# Patient Record
Sex: MALE | Race: WHITE | NOT HISPANIC OR LATINO | Employment: FULL TIME | URBAN - NONMETROPOLITAN AREA
[De-identification: names, ages, dates, MRNs, and addresses within clinical notes are randomized per-mention and may not be internally consistent; named-entity substitution may affect disease eponyms.]

---

## 2017-01-10 RX ORDER — DEXTROAMPHETAMINE SACCHARATE, AMPHETAMINE ASPARTATE MONOHYDRATE, DEXTROAMPHETAMINE SULFATE AND AMPHETAMINE SULFATE 5; 5; 5; 5 MG/1; MG/1; MG/1; MG/1
20 CAPSULE, EXTENDED RELEASE ORAL EVERY MORNING
Qty: 30 CAPSULE | Refills: 0 | Status: SHIPPED | OUTPATIENT
Start: 2017-01-10 | End: 2017-02-14 | Stop reason: SDUPTHER

## 2017-02-14 RX ORDER — DEXTROAMPHETAMINE SACCHARATE, AMPHETAMINE ASPARTATE MONOHYDRATE, DEXTROAMPHETAMINE SULFATE AND AMPHETAMINE SULFATE 5; 5; 5; 5 MG/1; MG/1; MG/1; MG/1
20 CAPSULE, EXTENDED RELEASE ORAL EVERY MORNING
Qty: 30 CAPSULE | Refills: 0 | Status: SHIPPED | OUTPATIENT
Start: 2017-02-14 | End: 2017-03-20 | Stop reason: SDUPTHER

## 2017-03-20 ENCOUNTER — OFFICE VISIT (OUTPATIENT)
Dept: FAMILY MEDICINE CLINIC | Facility: CLINIC | Age: 42
End: 2017-03-20

## 2017-03-20 VITALS
WEIGHT: 168 LBS | SYSTOLIC BLOOD PRESSURE: 118 MMHG | HEART RATE: 70 BPM | RESPIRATION RATE: 17 BRPM | BODY MASS INDEX: 24.88 KG/M2 | OXYGEN SATURATION: 97 % | TEMPERATURE: 98.3 F | HEIGHT: 69 IN | DIASTOLIC BLOOD PRESSURE: 78 MMHG

## 2017-03-20 DIAGNOSIS — F98.8 ATTENTION DEFICIT DISORDER (ADD): Primary | ICD-10-CM

## 2017-03-20 PROCEDURE — 99213 OFFICE O/P EST LOW 20 MIN: CPT | Performed by: NURSE PRACTITIONER

## 2017-03-20 RX ORDER — DEXTROAMPHETAMINE SACCHARATE, AMPHETAMINE ASPARTATE MONOHYDRATE, DEXTROAMPHETAMINE SULFATE AND AMPHETAMINE SULFATE 5; 5; 5; 5 MG/1; MG/1; MG/1; MG/1
20 CAPSULE, EXTENDED RELEASE ORAL EVERY MORNING
Qty: 30 CAPSULE | Refills: 0
Start: 2017-03-20 | End: 2017-04-17 | Stop reason: SDUPTHER

## 2017-03-20 RX ORDER — DEXTROAMPHETAMINE SACCHARATE, AMPHETAMINE ASPARTATE MONOHYDRATE, DEXTROAMPHETAMINE SULFATE AND AMPHETAMINE SULFATE 5; 5; 5; 5 MG/1; MG/1; MG/1; MG/1
20 CAPSULE, EXTENDED RELEASE ORAL EVERY MORNING
Qty: 30 CAPSULE | Refills: 0 | Status: SHIPPED | OUTPATIENT
Start: 2017-03-20 | End: 2017-03-20 | Stop reason: SDUPTHER

## 2017-03-20 NOTE — PROGRESS NOTES
Chief Complaint   Patient presents with   • ADD     3 month f/u requesting refill on Adderall       Subjective   HPI   Howard Cotton is a 41 y.o. male presents to the office for evaluation of:  ADD   This is a chronic problem. The current episode started more than 1 year ago. The problem occurs constantly. The problem has been unchanged. Pertinent negatives include no abdominal pain, anorexia, arthralgias, change in bowel habit, chest pain, chills, congestion, coughing, diaphoresis, fatigue, fever, headaches, joint swelling, myalgias, nausea, numbness, rash, sore throat, swollen glands, urinary symptoms, vertigo, visual change, vomiting or weakness. Nothing aggravates the symptoms. Treatments tried: adderall XR. The treatment provided no relief.     He presents for evaluation of ADD and refill of controlled medication adderall XR. He is tolerating his medication well and denies side effects.     Family History   Problem Relation Age of Onset   • Hypertension Other    • No Known Problems Mother    • Hyperlipidemia Father    • Heart disease Father    • No Known Problems Brother    • No Known Problems Daughter    • No Known Problems Son    • No Known Problems Maternal Grandmother    • No Known Problems Maternal Grandfather    • No Known Problems Paternal Grandmother    • Stroke Paternal Grandfather    • No Known Problems Brother    • No Known Problems Daughter    • No Known Problems Daughter      Social History     Social History   • Marital status:      Spouse name: Mitzi   • Number of children: 4   • Years of education: N/A     Occupational History   • Not on file.     Social History Main Topics   • Smoking status: Never Smoker   • Smokeless tobacco: Never Used      Comment: never smoked   • Alcohol use Yes      Comment: rarely   • Drug use: No   • Sexual activity: Yes     Partners: Female     Other Topics Concern   • Not on file     Social History Narrative   • No narrative on file       Review of  "Systems   Constitutional: Negative.  Negative for activity change, appetite change, chills, diaphoresis, fatigue, fever and unexpected weight change.   HENT: Negative.  Negative for congestion, drooling, facial swelling, postnasal drip, rhinorrhea, sinus pressure, sore throat, trouble swallowing and voice change.    Eyes: Negative.  Negative for photophobia, pain, discharge and visual disturbance.   Respiratory: Negative.  Negative for apnea, cough, choking and wheezing.    Cardiovascular: Negative.  Negative for chest pain, palpitations and leg swelling.   Gastrointestinal: Negative.  Negative for abdominal distention, abdominal pain, anorexia, change in bowel habit, constipation, diarrhea, nausea and vomiting.   Endocrine: Negative.  Negative for polyphagia and polyuria.   Genitourinary: Negative.  Negative for decreased urine volume, difficulty urinating and dysuria.   Musculoskeletal: Negative.  Negative for arthralgias, gait problem, joint swelling and myalgias.   Skin: Negative.  Negative for rash and wound.   Allergic/Immunologic: Negative.    Neurological: Negative.  Negative for dizziness, vertigo, syncope, weakness, light-headedness, numbness and headaches.   Hematological: Negative.    Psychiatric/Behavioral: Negative.  Negative for confusion, decreased concentration and sleep disturbance. The patient is not nervous/anxious.    All other systems reviewed and are negative.    14 Point ROS completed with pertinent positives discussed. All other systems reviewed and are negative.     The following portions of the patient's history were reviewed and updated as appropriate: allergies, current medications, past family history, past medical history, past social history, past surgical history and problem list.    Encounter Vitals  Vitals:    03/20/17 0836   BP: 118/78   Pulse: 70   Resp: 17   Temp: 98.3 °F (36.8 °C)   SpO2: 97%   Weight: 168 lb (76.2 kg)   Height: 69\" (175.3 cm)   PainSc: 0-No pain "       Objective:  Physical Exam   Constitutional: He is oriented to person, place, and time. Vital signs are normal. He appears well-developed and well-nourished.   HENT:   Head: Normocephalic and atraumatic.   Right Ear: Tympanic membrane, external ear and ear canal normal.   Left Ear: Tympanic membrane, external ear and ear canal normal.   Nose: Nose normal.   Mouth/Throat: Uvula is midline, oropharynx is clear and moist and mucous membranes are normal. No posterior oropharyngeal edema or posterior oropharyngeal erythema.   Eyes: Lids are normal. Pupils are equal, round, and reactive to light.   Neck: Trachea normal and normal range of motion. Neck supple. No thyroid mass present.   Cardiovascular: Normal rate, regular rhythm, S1 normal, S2 normal, normal heart sounds and intact distal pulses.  Exam reveals no gallop and no friction rub.    No murmur heard.  Pulmonary/Chest: Effort normal and breath sounds normal. No respiratory distress. He has no wheezes. He has no rales.   Abdominal: Soft. Normal appearance and bowel sounds are normal. He exhibits no mass. There is no rebound and no guarding.   Musculoskeletal: Normal range of motion.   Lymphadenopathy:     He has no cervical adenopathy.   Neurological: He is alert and oriented to person, place, and time. He has normal strength. No cranial nerve deficit or sensory deficit. Gait normal.   Skin: Skin is warm and dry. No rash noted.   Psychiatric: He has a normal mood and affect. His speech is normal and behavior is normal. Judgment and thought content normal. Cognition and memory are normal.   Nursing note and vitals reviewed.      Pertinent Labs  No visits with results within 3 Month(s) from this visit.  Latest known visit with results is:    Hospital Outpatient Visit on 11/05/2015   Component Date Value Ref Range Status   • Spec Descr 1 11/05/2015 SPECIMEN(S): A ANTRAL BIOPSY   Final   • Specimen description 2 11/05/2015 SPECIMEN(S): B ESOPHAGEAL BIOPSY   Final      Labs have been independently reviewed    Key Imaging/Tracings/POC Testing    Assessment and Plan  Howard was seen today for add.    Diagnoses and all orders for this visit:    Attention deficit disorder (ADD)    Other orders  -     amphetamine-dextroamphetamine XR (ADDERALL XR) 20 MG 24 hr capsule; Take 1 capsule by mouth Every Morning.    Side effects of ordered medications discussed with patient.     Additional Notes/Instructions    Follow-Up  Return if symptoms worsen or fail to improve, for Next scheduled follow up.    Patient/caregiver verbalizes understanding of all orders and instructions in this plan of care.

## 2017-04-18 RX ORDER — DEXTROAMPHETAMINE SACCHARATE, AMPHETAMINE ASPARTATE MONOHYDRATE, DEXTROAMPHETAMINE SULFATE AND AMPHETAMINE SULFATE 5; 5; 5; 5 MG/1; MG/1; MG/1; MG/1
20 CAPSULE, EXTENDED RELEASE ORAL EVERY MORNING
Qty: 30 CAPSULE | Refills: 0 | Status: SHIPPED | OUTPATIENT
Start: 2017-04-18 | End: 2017-05-18 | Stop reason: SDUPTHER

## 2017-05-18 ENCOUNTER — OFFICE VISIT (OUTPATIENT)
Dept: FAMILY MEDICINE CLINIC | Facility: CLINIC | Age: 42
End: 2017-05-18

## 2017-05-18 VITALS
TEMPERATURE: 97.8 F | SYSTOLIC BLOOD PRESSURE: 120 MMHG | DIASTOLIC BLOOD PRESSURE: 76 MMHG | WEIGHT: 170 LBS | HEIGHT: 69 IN | OXYGEN SATURATION: 99 % | HEART RATE: 80 BPM | BODY MASS INDEX: 25.18 KG/M2

## 2017-05-18 DIAGNOSIS — F98.8 ATTENTION DEFICIT DISORDER (ADD): Primary | ICD-10-CM

## 2017-05-18 DIAGNOSIS — Z02.5 ROUTINE SPORTS PHYSICAL EXAM: ICD-10-CM

## 2017-05-18 PROCEDURE — 99396 PREV VISIT EST AGE 40-64: CPT | Performed by: NURSE PRACTITIONER

## 2017-05-18 RX ORDER — DEXTROAMPHETAMINE SACCHARATE, AMPHETAMINE ASPARTATE MONOHYDRATE, DEXTROAMPHETAMINE SULFATE AND AMPHETAMINE SULFATE 5; 5; 5; 5 MG/1; MG/1; MG/1; MG/1
20 CAPSULE, EXTENDED RELEASE ORAL EVERY MORNING
Qty: 30 CAPSULE | Refills: 0 | Status: CANCELLED | OUTPATIENT
Start: 2017-05-18

## 2017-05-18 RX ORDER — DEXTROAMPHETAMINE SACCHARATE, AMPHETAMINE ASPARTATE MONOHYDRATE, DEXTROAMPHETAMINE SULFATE AND AMPHETAMINE SULFATE 5; 5; 5; 5 MG/1; MG/1; MG/1; MG/1
20 CAPSULE, EXTENDED RELEASE ORAL EVERY MORNING
Qty: 30 CAPSULE | Refills: 0 | Status: SHIPPED | OUTPATIENT
Start: 2017-05-18 | End: 2017-06-16 | Stop reason: SDUPTHER

## 2017-05-18 RX ORDER — ACYCLOVIR 800 MG/1
800 TABLET ORAL 4 TIMES DAILY
Qty: 28 TABLET | Refills: 0 | Status: SHIPPED | OUTPATIENT
Start: 2017-05-18 | End: 2017-12-22 | Stop reason: SDUPTHER

## 2017-06-16 ENCOUNTER — OFFICE VISIT (OUTPATIENT)
Dept: FAMILY MEDICINE CLINIC | Facility: CLINIC | Age: 42
End: 2017-06-16

## 2017-06-16 VITALS
BODY MASS INDEX: 24.88 KG/M2 | SYSTOLIC BLOOD PRESSURE: 118 MMHG | HEIGHT: 69 IN | HEART RATE: 76 BPM | RESPIRATION RATE: 16 BRPM | TEMPERATURE: 97.7 F | WEIGHT: 168 LBS | OXYGEN SATURATION: 96 % | DIASTOLIC BLOOD PRESSURE: 76 MMHG

## 2017-06-16 DIAGNOSIS — F98.8 ATTENTION DEFICIT DISORDER (ADD): Primary | ICD-10-CM

## 2017-06-16 PROCEDURE — 99213 OFFICE O/P EST LOW 20 MIN: CPT | Performed by: FAMILY MEDICINE

## 2017-06-16 RX ORDER — DEXTROAMPHETAMINE SACCHARATE, AMPHETAMINE ASPARTATE MONOHYDRATE, DEXTROAMPHETAMINE SULFATE AND AMPHETAMINE SULFATE 5; 5; 5; 5 MG/1; MG/1; MG/1; MG/1
20 CAPSULE, EXTENDED RELEASE ORAL EVERY MORNING
Qty: 30 CAPSULE | Refills: 0 | Status: SHIPPED | OUTPATIENT
Start: 2017-06-16 | End: 2017-07-14 | Stop reason: SDUPTHER

## 2017-06-16 NOTE — PROGRESS NOTES
Subjective   Howard Cotton is a 41 y.o. male.     History of Present Illness   Patient with history of attention deficit disorder in today for reevaluation and refill of medications.  Medications are still effective and he is tolerating without side effect.    The following portions of the patient's history were reviewed and updated as appropriate: allergies, current medications, past family history, past medical history, past social history, past surgical history and problem list.    Review of Systems   Constitutional: Negative.    HENT: Negative.    Eyes: Negative.    Respiratory: Negative.    Cardiovascular: Negative.    Gastrointestinal: Negative.    Endocrine: Negative.    Genitourinary: Negative.    Musculoskeletal: Negative.    Skin: Negative.    Allergic/Immunologic: Negative.    Neurological: Negative.    Hematological: Negative.    Psychiatric/Behavioral: Negative.    All other systems reviewed and are negative.      Objective   Physical Exam   Constitutional: He is oriented to person, place, and time. He appears well-developed and well-nourished.   HENT:   Head: Normocephalic and atraumatic.   Right Ear: External ear normal.   Left Ear: External ear normal.   Nose: Nose normal.   Mouth/Throat: Oropharynx is clear and moist.   Eyes: Conjunctivae and EOM are normal. Pupils are equal, round, and reactive to light.   Neck: Normal range of motion. Neck supple.   Cardiovascular: Normal rate, regular rhythm, normal heart sounds and intact distal pulses.  Exam reveals no gallop and no friction rub.    No murmur heard.  Pulmonary/Chest: Effort normal and breath sounds normal. He has no wheezes. He has no rales.   Abdominal: Soft. Bowel sounds are normal. He exhibits no mass. There is no tenderness. There is no rebound and no guarding.   Musculoskeletal: Normal range of motion.   Neurological: He is alert and oriented to person, place, and time. He has normal reflexes. No cranial nerve deficit. He exhibits  normal muscle tone.   Skin: Skin is warm and dry. No rash noted.   Psychiatric: He has a normal mood and affect. His behavior is normal. Judgment and thought content normal.   Nursing note and vitals reviewed.    Joel reviewed    Assessment/Plan   Howard was seen today for add and med refill.    Diagnoses and all orders for this visit:    Attention deficit disorder (ADD)    Other orders  -     amphetamine-dextroamphetamine XR (ADDERALL XR) 20 MG 24 hr capsule; Take 1 capsule by mouth Every Morning.

## 2017-07-14 RX ORDER — DEXTROAMPHETAMINE SACCHARATE, AMPHETAMINE ASPARTATE MONOHYDRATE, DEXTROAMPHETAMINE SULFATE AND AMPHETAMINE SULFATE 5; 5; 5; 5 MG/1; MG/1; MG/1; MG/1
20 CAPSULE, EXTENDED RELEASE ORAL EVERY MORNING
Qty: 30 CAPSULE | Refills: 0 | Status: SHIPPED | OUTPATIENT
Start: 2017-07-14 | End: 2017-08-18 | Stop reason: SDUPTHER

## 2017-08-18 RX ORDER — DEXTROAMPHETAMINE SACCHARATE, AMPHETAMINE ASPARTATE MONOHYDRATE, DEXTROAMPHETAMINE SULFATE AND AMPHETAMINE SULFATE 5; 5; 5; 5 MG/1; MG/1; MG/1; MG/1
20 CAPSULE, EXTENDED RELEASE ORAL EVERY MORNING
Qty: 30 CAPSULE | Refills: 0 | Status: SHIPPED | OUTPATIENT
Start: 2017-08-18 | End: 2017-09-12 | Stop reason: SDUPTHER

## 2017-09-12 ENCOUNTER — OFFICE VISIT (OUTPATIENT)
Dept: FAMILY MEDICINE CLINIC | Facility: CLINIC | Age: 42
End: 2017-09-12

## 2017-09-12 ENCOUNTER — LAB (OUTPATIENT)
Dept: LAB | Facility: OTHER | Age: 42
End: 2017-09-12

## 2017-09-12 VITALS
HEIGHT: 70 IN | HEART RATE: 96 BPM | BODY MASS INDEX: 24.34 KG/M2 | SYSTOLIC BLOOD PRESSURE: 118 MMHG | RESPIRATION RATE: 16 BRPM | OXYGEN SATURATION: 96 % | WEIGHT: 170 LBS | TEMPERATURE: 97.7 F | DIASTOLIC BLOOD PRESSURE: 82 MMHG

## 2017-09-12 DIAGNOSIS — F98.8 ATTENTION DEFICIT DISORDER (ADD): ICD-10-CM

## 2017-09-12 DIAGNOSIS — N40.1 BENIGN PROSTATIC HYPERPLASIA WITH LOWER URINARY TRACT SYMPTOMS, UNSPECIFIED MORPHOLOGY: Primary | ICD-10-CM

## 2017-09-12 PROCEDURE — 80307 DRUG TEST PRSMV CHEM ANLYZR: CPT | Performed by: FAMILY MEDICINE

## 2017-09-12 PROCEDURE — 99214 OFFICE O/P EST MOD 30 MIN: CPT | Performed by: FAMILY MEDICINE

## 2017-09-12 PROCEDURE — G0480 DRUG TEST DEF 1-7 CLASSES: HCPCS

## 2017-09-12 RX ORDER — FINASTERIDE 5 MG/1
5 TABLET, FILM COATED ORAL DAILY
Qty: 30 TABLET | Refills: 5 | Status: SHIPPED | OUTPATIENT
Start: 2017-09-12 | End: 2018-03-27 | Stop reason: SDUPTHER

## 2017-09-12 RX ORDER — DEXTROAMPHETAMINE SACCHARATE, AMPHETAMINE ASPARTATE MONOHYDRATE, DEXTROAMPHETAMINE SULFATE AND AMPHETAMINE SULFATE 5; 5; 5; 5 MG/1; MG/1; MG/1; MG/1
20 CAPSULE, EXTENDED RELEASE ORAL EVERY MORNING
Qty: 30 CAPSULE | Refills: 0 | Status: SHIPPED | OUTPATIENT
Start: 2017-09-12 | End: 2017-10-20 | Stop reason: SDUPTHER

## 2017-09-12 NOTE — PROGRESS NOTES
Subjective   Howard Cotton is a 41 y.o. male.   Chief Complaint   Patient presents with   • ADD     3 mo controlled requesting refill on Adderall XR       History of Present Illness   Patient with history of attention deficit disorder and BPH in today for reevaluation and refill of medications.  Patient is doing well and voices no acute complaints.  Medications are still effective and tolerating without side effect.    The following portions of the patient's history were reviewed and updated as appropriate: allergies, current medications, past family history, past medical history, past social history, past surgical history and problem list.    Review of Systems   Constitutional: Negative.    HENT: Negative.    Eyes: Negative.    Respiratory: Negative.    Cardiovascular: Negative.    Gastrointestinal: Negative.    Endocrine: Negative.    Genitourinary: Negative.    Musculoskeletal: Negative.    Skin: Negative.    Allergic/Immunologic: Negative.    Neurological: Negative.    Hematological: Negative.    Psychiatric/Behavioral: Negative.    All other systems reviewed and are negative.      Objective   Physical Exam   Constitutional: He is oriented to person, place, and time. He appears well-developed and well-nourished.   HENT:   Head: Normocephalic and atraumatic.   Right Ear: External ear normal.   Left Ear: External ear normal.   Nose: Nose normal.   Mouth/Throat: Oropharynx is clear and moist.   Eyes: Conjunctivae and EOM are normal. Pupils are equal, round, and reactive to light.   Neck: Normal range of motion. Neck supple.   Cardiovascular: Normal rate, regular rhythm, normal heart sounds and intact distal pulses.  Exam reveals no gallop and no friction rub.    No murmur heard.  Pulmonary/Chest: Effort normal and breath sounds normal. He has no wheezes. He has no rales.   Abdominal: Soft. Bowel sounds are normal. He exhibits no mass. There is no tenderness. There is no rebound and no guarding.    Musculoskeletal: Normal range of motion.   Neurological: He is alert and oriented to person, place, and time. He has normal reflexes. No cranial nerve deficit. He exhibits normal muscle tone.   Skin: Skin is warm and dry. No rash noted.   Psychiatric: He has a normal mood and affect. His behavior is normal. Judgment and thought content normal.   Nursing note and vitals reviewed.  anaid reviewed    Assessment/Plan   Howard was seen today for add.    Diagnoses and all orders for this visit:    Benign prostatic hyperplasia with lower urinary tract symptoms, unspecified morphology    Attention deficit disorder (ADD)  -     Pain Management Profile (13 Drugs) Urine; Future    Other orders  -     amphetamine-dextroamphetamine XR (ADDERALL XR) 20 MG 24 hr capsule; Take 1 capsule by mouth Every Morning.  -     finasteride (PROSCAR) 5 MG tablet; Take 1 tablet by mouth Daily.

## 2017-09-16 LAB
AMPHETAMINES UR QL SCN: POSITIVE
BARBITURATES UR QL SCN: NEGATIVE NG/ML
BENZODIAZ UR QL SCN: NEGATIVE NG/ML
BZE UR QL SCN: NEGATIVE NG/ML
CANNABINOIDS UR QL SCN: NEGATIVE NG/ML
CREAT 24H UR-MCNC: 255 MG/DL (ref 20–300)
FENTANYL+NORFENTANYL UR QL SCN: NEGATIVE PG/ML
Lab: ABNORMAL
MEPERIDINE UR CFM-MCNC: NEGATIVE NG/ML
METHADONE UR QL SCN: NEGATIVE NG/ML
OPIATES TESTED UR SCN: NEGATIVE NG/ML
OXYCODONE/OXYMORPHONE, URINE: NEGATIVE NG/ML
PCP UR QL: NEGATIVE NG/ML
PH UR STRIP.AUTO: 5.5 [PH] (ref 4.5–8.9)
PROPOXYPH UR QL SCN: NEGATIVE NG/ML
SP GR UR: 1.03
TRAMADOL UR QL SCN: NEGATIVE NG/ML

## 2017-10-20 RX ORDER — DEXTROAMPHETAMINE SACCHARATE, AMPHETAMINE ASPARTATE MONOHYDRATE, DEXTROAMPHETAMINE SULFATE AND AMPHETAMINE SULFATE 5; 5; 5; 5 MG/1; MG/1; MG/1; MG/1
20 CAPSULE, EXTENDED RELEASE ORAL EVERY MORNING
Qty: 30 CAPSULE | Refills: 0 | Status: SHIPPED | OUTPATIENT
Start: 2017-10-20 | End: 2017-11-20 | Stop reason: SDUPTHER

## 2017-11-20 RX ORDER — DEXTROAMPHETAMINE SACCHARATE, AMPHETAMINE ASPARTATE MONOHYDRATE, DEXTROAMPHETAMINE SULFATE AND AMPHETAMINE SULFATE 5; 5; 5; 5 MG/1; MG/1; MG/1; MG/1
20 CAPSULE, EXTENDED RELEASE ORAL EVERY MORNING
Qty: 30 CAPSULE | Refills: 0 | Status: SHIPPED | OUTPATIENT
Start: 2017-11-20 | End: 2017-12-22 | Stop reason: SDUPTHER

## 2017-12-21 ENCOUNTER — TELEPHONE (OUTPATIENT)
Dept: FAMILY MEDICINE CLINIC | Facility: CLINIC | Age: 42
End: 2017-12-21

## 2017-12-22 ENCOUNTER — OFFICE VISIT (OUTPATIENT)
Dept: FAMILY MEDICINE CLINIC | Facility: CLINIC | Age: 42
End: 2017-12-22

## 2017-12-22 VITALS
OXYGEN SATURATION: 99 % | TEMPERATURE: 97.9 F | DIASTOLIC BLOOD PRESSURE: 76 MMHG | SYSTOLIC BLOOD PRESSURE: 112 MMHG | RESPIRATION RATE: 16 BRPM | HEIGHT: 70 IN | BODY MASS INDEX: 24.34 KG/M2 | HEART RATE: 70 BPM | WEIGHT: 170 LBS

## 2017-12-22 DIAGNOSIS — F90.2 ATTENTION DEFICIT HYPERACTIVITY DISORDER (ADHD), COMBINED TYPE: Primary | ICD-10-CM

## 2017-12-22 PROCEDURE — 99213 OFFICE O/P EST LOW 20 MIN: CPT | Performed by: FAMILY MEDICINE

## 2017-12-22 RX ORDER — ACYCLOVIR 800 MG/1
800 TABLET ORAL 4 TIMES DAILY
Qty: 28 TABLET | Refills: 0 | Status: SHIPPED | OUTPATIENT
Start: 2017-12-22 | End: 2019-01-24 | Stop reason: SDUPTHER

## 2017-12-22 RX ORDER — DEXTROAMPHETAMINE SACCHARATE, AMPHETAMINE ASPARTATE MONOHYDRATE, DEXTROAMPHETAMINE SULFATE AND AMPHETAMINE SULFATE 5; 5; 5; 5 MG/1; MG/1; MG/1; MG/1
20 CAPSULE, EXTENDED RELEASE ORAL EVERY MORNING
Qty: 30 CAPSULE | Refills: 0 | Status: SHIPPED | OUTPATIENT
Start: 2017-12-22 | End: 2018-01-24 | Stop reason: SDUPTHER

## 2017-12-22 NOTE — PROGRESS NOTES
Subjective   Howard Cotton is a 42 y.o. male.   Chief Complaint   Patient presents with   • ADD     3 month f/u        ADD   This is a chronic problem. The current episode started more than 1 year ago. The problem occurs daily. The problem has been unchanged. Associated symptoms comments: Well controlled. Nothing aggravates the symptoms. Treatments tried: Adderral  The treatment provided significant relief.        The following portions of the patient's history were reviewed and updated as appropriate: allergies, current medications, past family history, past medical history, past social history, past surgical history and problem list.    Review of Systems   Constitutional: Negative.    HENT: Negative.    Eyes: Negative.    Respiratory: Negative.    Cardiovascular: Negative.    Gastrointestinal: Negative.    Endocrine: Negative.    Genitourinary: Negative.    Musculoskeletal: Negative.    Skin: Negative.    Allergic/Immunologic: Negative.    Neurological: Negative.    Hematological: Negative.    Psychiatric/Behavioral: Negative.    All other systems reviewed and are negative.      Objective   Physical Exam   Constitutional: He is oriented to person, place, and time. He appears well-developed and well-nourished.   HENT:   Head: Normocephalic and atraumatic.   Right Ear: External ear normal.   Left Ear: External ear normal.   Nose: Nose normal.   Mouth/Throat: Oropharynx is clear and moist.   Eyes: Conjunctivae and EOM are normal. Pupils are equal, round, and reactive to light.   Neck: Normal range of motion. Neck supple.   Cardiovascular: Normal rate, regular rhythm, normal heart sounds and intact distal pulses.  Exam reveals no gallop and no friction rub.    No murmur heard.  Pulmonary/Chest: Effort normal and breath sounds normal. He has no wheezes. He has no rales.   Abdominal: Soft. Bowel sounds are normal. He exhibits no mass. There is no tenderness. There is no rebound and no guarding.   Musculoskeletal:  Normal range of motion.   Neurological: He is alert and oriented to person, place, and time. He has normal reflexes. No cranial nerve deficit. He exhibits normal muscle tone.   Skin: Skin is warm and dry. No rash noted.   Psychiatric: He has a normal mood and affect. His behavior is normal. Judgment and thought content normal.   Nursing note and vitals reviewed.  Joel reviewed    Assessment/Plan   Howard was seen today for add.    Diagnoses and all orders for this visit:    Attention deficit hyperactivity disorder (ADHD), combined type    Other orders  -     amphetamine-dextroamphetamine XR (ADDERALL XR) 20 MG 24 hr capsule; Take 1 capsule by mouth Every Morning Earliest Fill Date: 12/22/17

## 2018-01-24 RX ORDER — DEXTROAMPHETAMINE SACCHARATE, AMPHETAMINE ASPARTATE MONOHYDRATE, DEXTROAMPHETAMINE SULFATE AND AMPHETAMINE SULFATE 5; 5; 5; 5 MG/1; MG/1; MG/1; MG/1
20 CAPSULE, EXTENDED RELEASE ORAL EVERY MORNING
Qty: 30 CAPSULE | Refills: 0 | Status: SHIPPED | OUTPATIENT
Start: 2018-01-24 | End: 2018-02-23 | Stop reason: SDUPTHER

## 2018-02-23 RX ORDER — DEXTROAMPHETAMINE SACCHARATE, AMPHETAMINE ASPARTATE MONOHYDRATE, DEXTROAMPHETAMINE SULFATE AND AMPHETAMINE SULFATE 5; 5; 5; 5 MG/1; MG/1; MG/1; MG/1
20 CAPSULE, EXTENDED RELEASE ORAL EVERY MORNING
Qty: 30 CAPSULE | Refills: 0 | Status: SHIPPED | OUTPATIENT
Start: 2018-02-23 | End: 2018-03-27 | Stop reason: SDUPTHER

## 2018-03-27 ENCOUNTER — OFFICE VISIT (OUTPATIENT)
Dept: FAMILY MEDICINE CLINIC | Facility: CLINIC | Age: 43
End: 2018-03-27

## 2018-03-27 VITALS
WEIGHT: 176 LBS | HEIGHT: 69 IN | OXYGEN SATURATION: 96 % | BODY MASS INDEX: 26.07 KG/M2 | RESPIRATION RATE: 16 BRPM | SYSTOLIC BLOOD PRESSURE: 114 MMHG | TEMPERATURE: 96.8 F | HEART RATE: 84 BPM | DIASTOLIC BLOOD PRESSURE: 76 MMHG

## 2018-03-27 DIAGNOSIS — N40.0 BENIGN PROSTATIC HYPERPLASIA WITHOUT LOWER URINARY TRACT SYMPTOMS: ICD-10-CM

## 2018-03-27 DIAGNOSIS — F90.2 ATTENTION DEFICIT HYPERACTIVITY DISORDER (ADHD), COMBINED TYPE: Primary | ICD-10-CM

## 2018-03-27 PROCEDURE — 99213 OFFICE O/P EST LOW 20 MIN: CPT | Performed by: FAMILY MEDICINE

## 2018-03-27 RX ORDER — DEXTROAMPHETAMINE SACCHARATE, AMPHETAMINE ASPARTATE MONOHYDRATE, DEXTROAMPHETAMINE SULFATE AND AMPHETAMINE SULFATE 5; 5; 5; 5 MG/1; MG/1; MG/1; MG/1
20 CAPSULE, EXTENDED RELEASE ORAL EVERY MORNING
Qty: 30 CAPSULE | Refills: 0 | Status: SHIPPED | OUTPATIENT
Start: 2018-03-27 | End: 2018-04-25 | Stop reason: SDUPTHER

## 2018-03-27 RX ORDER — FINASTERIDE 5 MG/1
5 TABLET, FILM COATED ORAL DAILY
Qty: 30 TABLET | Refills: 5 | Status: SHIPPED | OUTPATIENT
Start: 2018-03-27 | End: 2018-07-09 | Stop reason: SDUPTHER

## 2018-03-27 NOTE — PROGRESS NOTES
Subjective   Howard Cotton is a 42 y.o. male.   Chief Complaint   Patient presents with   • ADHD     controlled refill   • Med Refill       History of Present Illness   Patient with history of adult attention deficit disorder as well as BPH is in today for reevaluation and to obtain refill of medication.  Patient states that medications are still effective.  He is tolerating medications without side effect.    The following portions of the patient's history were reviewed and updated as appropriate: allergies, current medications, past family history, past medical history, past social history, past surgical history and problem list.    Review of Systems   Constitutional: Negative.    HENT: Negative.    Eyes: Negative.    Respiratory: Negative.    Cardiovascular: Negative.    Gastrointestinal: Negative.    Endocrine: Negative.    Genitourinary: Negative.    Musculoskeletal: Negative.    Skin: Negative.    Allergic/Immunologic: Negative.    Neurological: Negative.    Hematological: Negative.    Psychiatric/Behavioral: Negative.    All other systems reviewed and are negative.      Objective   Physical Exam   Constitutional: He is oriented to person, place, and time. He appears well-developed and well-nourished.   HENT:   Head: Normocephalic and atraumatic.   Right Ear: External ear normal.   Left Ear: External ear normal.   Nose: Nose normal.   Mouth/Throat: Oropharynx is clear and moist.   Eyes: Conjunctivae and EOM are normal. Pupils are equal, round, and reactive to light.   Neck: Normal range of motion. Neck supple.   Cardiovascular: Normal rate, regular rhythm, normal heart sounds and intact distal pulses.  Exam reveals no gallop and no friction rub.    No murmur heard.  Pulmonary/Chest: Effort normal and breath sounds normal. He has no wheezes. He has no rales.   Abdominal: Soft. Bowel sounds are normal. He exhibits no mass. There is no tenderness. There is no rebound and no guarding.   Musculoskeletal:  Normal range of motion.   Neurological: He is alert and oriented to person, place, and time. He has normal reflexes. No cranial nerve deficit. He exhibits normal muscle tone.   Skin: Skin is warm and dry. No rash noted.   Psychiatric: He has a normal mood and affect. His behavior is normal. Judgment and thought content normal.   Nursing note and vitals reviewed.    Joel reviewed  Assessment/Plan   Howard was seen today for adhd and med refill.    Diagnoses and all orders for this visit:    Attention deficit hyperactivity disorder (ADHD), combined type    Benign prostatic hyperplasia without lower urinary tract symptoms    Other orders  -     finasteride (PROSCAR) 5 MG tablet; Take 1 tablet by mouth Daily.  -     amphetamine-dextroamphetamine XR (ADDERALL XR) 20 MG 24 hr capsule; Take 1 capsule by mouth Every Morning Earliest Fill Date: 3/27/18

## 2018-04-25 RX ORDER — DEXTROAMPHETAMINE SACCHARATE, AMPHETAMINE ASPARTATE MONOHYDRATE, DEXTROAMPHETAMINE SULFATE AND AMPHETAMINE SULFATE 5; 5; 5; 5 MG/1; MG/1; MG/1; MG/1
20 CAPSULE, EXTENDED RELEASE ORAL EVERY MORNING
Qty: 30 CAPSULE | Refills: 0 | Status: SHIPPED | OUTPATIENT
Start: 2018-04-25 | End: 2018-05-29 | Stop reason: SDUPTHER

## 2018-05-29 RX ORDER — DEXTROAMPHETAMINE SACCHARATE, AMPHETAMINE ASPARTATE MONOHYDRATE, DEXTROAMPHETAMINE SULFATE AND AMPHETAMINE SULFATE 5; 5; 5; 5 MG/1; MG/1; MG/1; MG/1
20 CAPSULE, EXTENDED RELEASE ORAL EVERY MORNING
Qty: 30 CAPSULE | Refills: 0 | Status: SHIPPED | OUTPATIENT
Start: 2018-05-29 | End: 2018-07-09 | Stop reason: SDUPTHER

## 2018-07-09 ENCOUNTER — OFFICE VISIT (OUTPATIENT)
Dept: FAMILY MEDICINE CLINIC | Facility: CLINIC | Age: 43
End: 2018-07-09

## 2018-07-09 VITALS
HEART RATE: 75 BPM | BODY MASS INDEX: 26.22 KG/M2 | SYSTOLIC BLOOD PRESSURE: 110 MMHG | OXYGEN SATURATION: 98 % | TEMPERATURE: 97.9 F | WEIGHT: 177 LBS | HEIGHT: 69 IN | DIASTOLIC BLOOD PRESSURE: 60 MMHG

## 2018-07-09 DIAGNOSIS — F90.2 ATTENTION DEFICIT HYPERACTIVITY DISORDER (ADHD), COMBINED TYPE: Primary | ICD-10-CM

## 2018-07-09 DIAGNOSIS — N40.0 BENIGN PROSTATIC HYPERPLASIA WITHOUT LOWER URINARY TRACT SYMPTOMS: ICD-10-CM

## 2018-07-09 PROCEDURE — 99213 OFFICE O/P EST LOW 20 MIN: CPT | Performed by: FAMILY MEDICINE

## 2018-07-09 RX ORDER — DEXTROAMPHETAMINE SACCHARATE, AMPHETAMINE ASPARTATE MONOHYDRATE, DEXTROAMPHETAMINE SULFATE AND AMPHETAMINE SULFATE 5; 5; 5; 5 MG/1; MG/1; MG/1; MG/1
20 CAPSULE, EXTENDED RELEASE ORAL EVERY MORNING
Qty: 30 CAPSULE | Refills: 0 | Status: SHIPPED | OUTPATIENT
Start: 2018-07-09 | End: 2018-08-08 | Stop reason: SDUPTHER

## 2018-07-09 RX ORDER — FINASTERIDE 5 MG/1
5 TABLET, FILM COATED ORAL DAILY
Qty: 30 TABLET | Refills: 5 | Status: SHIPPED | OUTPATIENT
Start: 2018-07-09 | End: 2018-11-07 | Stop reason: SDUPTHER

## 2018-07-09 NOTE — PROGRESS NOTES
Subjective   Howard Cotton is a 42 y.o. male.   Chief Complaint   Patient presents with   • ADHD     Refill Adderall        History of Present Illness   Patient with history of attention deficit as well as BPH is in today for reevaluation and refill medications.  Patient states that medications are still effective and he is tolerating without side effect.  He voices no acute complaints    The following portions of the patient's history were reviewed and updated as appropriate: allergies, current medications, past family history, past medical history, past social history, past surgical history and problem list.    Review of Systems   Constitutional: Negative.    HENT: Negative.    Eyes: Negative.    Respiratory: Negative.    Cardiovascular: Negative.    Gastrointestinal: Negative.    Endocrine: Negative.    Genitourinary: Negative.    Musculoskeletal: Negative.    Skin: Negative.    Allergic/Immunologic: Negative.    Neurological: Negative.    Hematological: Negative.    Psychiatric/Behavioral: Negative.    All other systems reviewed and are negative.      Objective   Physical Exam   Constitutional: He is oriented to person, place, and time. He appears well-developed and well-nourished.   HENT:   Head: Normocephalic and atraumatic.   Right Ear: External ear normal.   Left Ear: External ear normal.   Nose: Nose normal.   Mouth/Throat: Oropharynx is clear and moist.   Eyes: Conjunctivae and EOM are normal. Pupils are equal, round, and reactive to light.   Neck: Normal range of motion. Neck supple.   Cardiovascular: Normal rate, regular rhythm, normal heart sounds and intact distal pulses.  Exam reveals no gallop and no friction rub.    No murmur heard.  Pulmonary/Chest: Effort normal and breath sounds normal. He has no wheezes. He has no rales.   Abdominal: Soft. Bowel sounds are normal. He exhibits no mass. There is no tenderness. There is no rebound and no guarding.   Musculoskeletal: Normal range of motion.    Neurological: He is alert and oriented to person, place, and time. He has normal reflexes. No cranial nerve deficit. He exhibits normal muscle tone.   Skin: Skin is warm and dry. No rash noted.   Psychiatric: He has a normal mood and affect. His behavior is normal. Judgment and thought content normal.   Nursing note and vitals reviewed.  Joel reviewed    Assessment/Plan   Howard was seen today for adhd.    Diagnoses and all orders for this visit:    Attention deficit hyperactivity disorder (ADHD), combined type    Benign prostatic hyperplasia without lower urinary tract symptoms    Other orders  -     amphetamine-dextroamphetamine XR (ADDERALL XR) 20 MG 24 hr capsule; Take 1 capsule by mouth Every Morning Earliest Fill Date: 7/9/18  -     finasteride (PROSCAR) 5 MG tablet; Take 1 tablet by mouth Daily.

## 2018-08-08 RX ORDER — DEXTROAMPHETAMINE SACCHARATE, AMPHETAMINE ASPARTATE MONOHYDRATE, DEXTROAMPHETAMINE SULFATE AND AMPHETAMINE SULFATE 5; 5; 5; 5 MG/1; MG/1; MG/1; MG/1
20 CAPSULE, EXTENDED RELEASE ORAL EVERY MORNING
Qty: 30 CAPSULE | Refills: 0 | Status: SHIPPED | OUTPATIENT
Start: 2018-08-08 | End: 2018-09-04 | Stop reason: SDUPTHER

## 2018-09-04 DIAGNOSIS — F90.2 ATTENTION DEFICIT HYPERACTIVITY DISORDER (ADHD), COMBINED TYPE: Primary | ICD-10-CM

## 2018-09-04 RX ORDER — DEXTROAMPHETAMINE SACCHARATE, AMPHETAMINE ASPARTATE MONOHYDRATE, DEXTROAMPHETAMINE SULFATE AND AMPHETAMINE SULFATE 5; 5; 5; 5 MG/1; MG/1; MG/1; MG/1
20 CAPSULE, EXTENDED RELEASE ORAL EVERY MORNING
Qty: 30 CAPSULE | Refills: 0 | Status: SHIPPED | OUTPATIENT
Start: 2018-09-04 | End: 2018-10-08 | Stop reason: SDUPTHER

## 2018-10-08 DIAGNOSIS — F90.2 ATTENTION DEFICIT HYPERACTIVITY DISORDER (ADHD), COMBINED TYPE: ICD-10-CM

## 2018-10-08 RX ORDER — DEXTROAMPHETAMINE SACCHARATE, AMPHETAMINE ASPARTATE MONOHYDRATE, DEXTROAMPHETAMINE SULFATE AND AMPHETAMINE SULFATE 5; 5; 5; 5 MG/1; MG/1; MG/1; MG/1
20 CAPSULE, EXTENDED RELEASE ORAL EVERY MORNING
Qty: 30 CAPSULE | Refills: 0 | Status: SHIPPED | OUTPATIENT
Start: 2018-10-08 | End: 2018-11-07 | Stop reason: SDUPTHER

## 2018-11-07 ENCOUNTER — OFFICE VISIT (OUTPATIENT)
Dept: FAMILY MEDICINE CLINIC | Facility: CLINIC | Age: 43
End: 2018-11-07

## 2018-11-07 VITALS
OXYGEN SATURATION: 96 % | HEART RATE: 89 BPM | SYSTOLIC BLOOD PRESSURE: 126 MMHG | HEIGHT: 69 IN | DIASTOLIC BLOOD PRESSURE: 76 MMHG | WEIGHT: 179 LBS | TEMPERATURE: 97.6 F | BODY MASS INDEX: 26.51 KG/M2

## 2018-11-07 DIAGNOSIS — F90.2 ATTENTION DEFICIT HYPERACTIVITY DISORDER (ADHD), COMBINED TYPE: ICD-10-CM

## 2018-11-07 DIAGNOSIS — N40.0 BENIGN PROSTATIC HYPERPLASIA WITHOUT LOWER URINARY TRACT SYMPTOMS: Primary | ICD-10-CM

## 2018-11-07 PROCEDURE — 99213 OFFICE O/P EST LOW 20 MIN: CPT | Performed by: NURSE PRACTITIONER

## 2018-11-07 RX ORDER — DEXTROAMPHETAMINE SACCHARATE, AMPHETAMINE ASPARTATE MONOHYDRATE, DEXTROAMPHETAMINE SULFATE AND AMPHETAMINE SULFATE 5; 5; 5; 5 MG/1; MG/1; MG/1; MG/1
20 CAPSULE, EXTENDED RELEASE ORAL EVERY MORNING
Qty: 30 CAPSULE | Refills: 0 | Status: SHIPPED | OUTPATIENT
Start: 2018-11-07 | End: 2018-12-03 | Stop reason: SDUPTHER

## 2018-11-07 RX ORDER — HYDROXYZINE HYDROCHLORIDE 10 MG/1
TABLET, FILM COATED ORAL
COMMUNITY
Start: 2018-09-28 | End: 2021-07-09

## 2018-11-07 RX ORDER — FINASTERIDE 5 MG/1
5 TABLET, FILM COATED ORAL DAILY
Qty: 90 TABLET | Refills: 3 | Status: SHIPPED | OUTPATIENT
Start: 2018-11-07 | End: 2019-01-21 | Stop reason: SDUPTHER

## 2018-11-23 NOTE — PROGRESS NOTES
Subjective  Howard Cotton is a 42 y.o. male who presents to the office for ADHD follow-up.  Will need a medication refill as it does help with his ability to focus.  History of Present Illness     The following portions of the patient's history were reviewed and updated as appropriate: allergies, current medications, past family history, past medical history, past social history, past surgical history and problem list.    Review of Systems   Constitutional: Negative for chills, fatigue and fever.   HENT: Negative for congestion, sneezing, sore throat and trouble swallowing.    Eyes: Negative for visual disturbance.   Respiratory: Negative for cough, chest tightness, shortness of breath and wheezing.    Cardiovascular: Negative for chest pain, palpitations and leg swelling.   Gastrointestinal: Negative for abdominal pain, constipation, diarrhea, nausea and vomiting.   Genitourinary: Negative for dysuria, frequency and urgency.   Musculoskeletal: Negative for neck pain.   Skin: Negative for rash.   Neurological: Negative for dizziness, weakness and headaches.   Psychiatric/Behavioral: Positive for decreased concentration.        In the past two weeks the pt has not felt down, depressed, hopeless or lost interest in doing things   All other systems reviewed and are negative.      Objective   Physical Exam   Constitutional: He is oriented to person, place, and time. He appears well-developed and well-nourished. He is cooperative.  Non-toxic appearance. He does not have a sickly appearance. He does not appear ill.   HENT:   Head: Normocephalic and atraumatic.   Right Ear: External ear normal.   Left Ear: External ear normal.   Nose: Nose normal.   Mouth/Throat: Uvula is midline, oropharynx is clear and moist and mucous membranes are normal. No posterior oropharyngeal edema or posterior oropharyngeal erythema.   Eyes: Conjunctivae and EOM are normal. Pupils are equal, round, and reactive to light. Right eye exhibits  no discharge. Left eye exhibits no discharge. No scleral icterus.   Neck: Normal range of motion. Neck supple. No thyromegaly present.   Cardiovascular: Normal rate, regular rhythm, normal heart sounds and intact distal pulses. Exam reveals no gallop and no friction rub.   No murmur heard.  Pulmonary/Chest: Effort normal and breath sounds normal. No respiratory distress. He has no wheezes. He has no rales.   Abdominal: Soft. Bowel sounds are normal. He exhibits no distension. There is no tenderness. There is no rebound and no guarding.   Musculoskeletal: Normal range of motion. He exhibits no edema.   Lymphadenopathy:     He has no cervical adenopathy.   Neurological: He is alert and oriented to person, place, and time. No cranial nerve deficit.   Skin: Skin is warm and dry. No rash noted.   Psychiatric: He has a normal mood and affect. His behavior is normal. Judgment and thought content normal.   Nursing note and vitals reviewed.      Assessment/Plan   Howard was seen today for adhd.    Diagnoses and all orders for this visit:    Benign prostatic hyperplasia without lower urinary tract symptoms    Attention deficit hyperactivity disorder (ADHD), combined type  -     amphetamine-dextroamphetamine XR (ADDERALL XR) 20 MG 24 hr capsule; Take 1 capsule by mouth Every Morning Earliest Fill Date: 11/7/18    Other orders  -     finasteride (PROSCAR) 5 MG tablet; Take 1 tablet by mouth Daily.    Encouraged heart healthy diet, exercise at least three days of the week.  Continue with meds as ordered.     Patient understands the risks associated with this controlled medication, including tolerance and addiction.  Patient also agrees to only obtain this medication from me, and not from a another provider, unless that provider is covering for me in my absence.  Patient also agrees to be compliant in dosing, and not self adjust the dose of medication.  A signed controlled substance agreement is on file, and the patient has  received a controlled substance education sheet at this a previous visit.  The patient has also signed a consent for treatment with a controlled substance as per [facility] policy. KEITH was obtained, reviewed # [id number].

## 2018-12-03 DIAGNOSIS — F90.2 ATTENTION DEFICIT HYPERACTIVITY DISORDER (ADHD), COMBINED TYPE: ICD-10-CM

## 2018-12-05 DIAGNOSIS — F90.2 ATTENTION DEFICIT HYPERACTIVITY DISORDER (ADHD), COMBINED TYPE: ICD-10-CM

## 2018-12-05 RX ORDER — DEXTROAMPHETAMINE SACCHARATE, AMPHETAMINE ASPARTATE MONOHYDRATE, DEXTROAMPHETAMINE SULFATE AND AMPHETAMINE SULFATE 5; 5; 5; 5 MG/1; MG/1; MG/1; MG/1
20 CAPSULE, EXTENDED RELEASE ORAL EVERY MORNING
Qty: 30 CAPSULE | Refills: 0 | Status: SHIPPED | OUTPATIENT
Start: 2018-12-05 | End: 2019-01-02 | Stop reason: SDUPTHER

## 2018-12-05 RX ORDER — DEXTROAMPHETAMINE SACCHARATE, AMPHETAMINE ASPARTATE MONOHYDRATE, DEXTROAMPHETAMINE SULFATE AND AMPHETAMINE SULFATE 5; 5; 5; 5 MG/1; MG/1; MG/1; MG/1
20 CAPSULE, EXTENDED RELEASE ORAL EVERY MORNING
Qty: 30 CAPSULE | Refills: 0 | Status: SHIPPED | OUTPATIENT
Start: 2018-12-05 | End: 2018-12-05 | Stop reason: SDUPTHER

## 2018-12-10 DIAGNOSIS — Z79.899 LONG TERM CURRENT USE OF THERAPEUTIC DRUG: ICD-10-CM

## 2018-12-10 DIAGNOSIS — Z00.00 WELLNESS EXAMINATION: Primary | ICD-10-CM

## 2018-12-11 ENCOUNTER — LAB (OUTPATIENT)
Dept: LAB | Facility: OTHER | Age: 43
End: 2018-12-11

## 2018-12-11 DIAGNOSIS — Z79.899 LONG TERM CURRENT USE OF THERAPEUTIC DRUG: ICD-10-CM

## 2018-12-11 DIAGNOSIS — Z00.00 WELLNESS EXAMINATION: ICD-10-CM

## 2018-12-11 LAB
ALBUMIN SERPL-MCNC: 5.2 G/DL (ref 3.5–5)
ALBUMIN/GLOB SERPL: 1.7 G/DL (ref 1.1–1.8)
ALP SERPL-CCNC: 50 U/L (ref 38–126)
ALT SERPL W P-5'-P-CCNC: 23 U/L
ANION GAP SERPL CALCULATED.3IONS-SCNC: 11 MMOL/L (ref 5–15)
AST SERPL-CCNC: 32 U/L (ref 17–59)
BASOPHILS # BLD AUTO: 0.02 10*3/MM3 (ref 0–0.2)
BASOPHILS NFR BLD AUTO: 0.5 % (ref 0–2)
BILIRUB SERPL-MCNC: 1 MG/DL (ref 0.2–1.3)
BUN BLD-MCNC: 15 MG/DL (ref 9–20)
BUN/CREAT SERPL: 11.7 (ref 7–25)
CALCIUM SPEC-SCNC: 9.8 MG/DL (ref 8.4–10.2)
CHLORIDE SERPL-SCNC: 105 MMOL/L (ref 98–107)
CHOLEST SERPL-MCNC: 201 MG/DL (ref 150–200)
CO2 SERPL-SCNC: 26 MMOL/L (ref 22–30)
CREAT BLD-MCNC: 1.28 MG/DL (ref 0.66–1.25)
DEPRECATED RDW RBC AUTO: 41.9 FL (ref 35.1–43.9)
EOSINOPHIL # BLD AUTO: 0.01 10*3/MM3 (ref 0–0.7)
EOSINOPHIL NFR BLD AUTO: 0.2 % (ref 0–7)
ERYTHROCYTE [DISTWIDTH] IN BLOOD BY AUTOMATED COUNT: 12.6 % (ref 11.5–14.5)
GFR SERPL CREATININE-BSD FRML MDRD: 61 ML/MIN/1.73 (ref 63–147)
GLOBULIN UR ELPH-MCNC: 3 GM/DL (ref 2.3–3.5)
GLUCOSE BLD-MCNC: 105 MG/DL (ref 74–99)
HCT VFR BLD AUTO: 42.6 % (ref 39–49)
HDLC SERPL-MCNC: 57 MG/DL (ref 40–59)
HGB BLD-MCNC: 14.9 G/DL (ref 13.7–17.3)
LDLC SERPL CALC-MCNC: 117 MG/DL
LDLC/HDLC SERPL: 2.05 {RATIO} (ref 0–3.55)
LYMPHOCYTES # BLD AUTO: 1.2 10*3/MM3 (ref 0.6–4.2)
LYMPHOCYTES NFR BLD AUTO: 29.5 % (ref 10–50)
MCH RBC QN AUTO: 32.6 PG (ref 26.5–34)
MCHC RBC AUTO-ENTMCNC: 35 G/DL (ref 31.5–36.3)
MCV RBC AUTO: 93.2 FL (ref 80–98)
MONOCYTES # BLD AUTO: 0.26 10*3/MM3 (ref 0–0.9)
MONOCYTES NFR BLD AUTO: 6.4 % (ref 0–12)
NEUTROPHILS # BLD AUTO: 2.58 10*3/MM3 (ref 2–8.6)
NEUTROPHILS NFR BLD AUTO: 63.4 % (ref 37–80)
PLATELET # BLD AUTO: 221 10*3/MM3 (ref 150–450)
PMV BLD AUTO: 10.7 FL (ref 8–12)
POTASSIUM BLD-SCNC: 4.2 MMOL/L (ref 3.4–5)
PROT SERPL-MCNC: 8.2 G/DL (ref 6.3–8.2)
PSA SERPL-MCNC: 0.5 NG/ML (ref 0–4)
RBC # BLD AUTO: 4.57 10*6/MM3 (ref 4.37–5.74)
SODIUM BLD-SCNC: 142 MMOL/L (ref 137–145)
T4 FREE SERPL-MCNC: 1.14 NG/DL (ref 0.78–2.19)
TRIGL SERPL-MCNC: 136 MG/DL
TSH SERPL DL<=0.05 MIU/L-ACNC: 1.74 MIU/ML (ref 0.46–4.68)
VLDLC SERPL-MCNC: 27.2 MG/DL
WBC NRBC COR # BLD: 4.07 10*3/MM3 (ref 3.2–9.8)

## 2018-12-11 PROCEDURE — 36415 COLL VENOUS BLD VENIPUNCTURE: CPT | Performed by: NURSE PRACTITIONER

## 2018-12-11 PROCEDURE — 84403 ASSAY OF TOTAL TESTOSTERONE: CPT | Performed by: NURSE PRACTITIONER

## 2018-12-11 PROCEDURE — 80061 LIPID PANEL: CPT | Performed by: NURSE PRACTITIONER

## 2018-12-11 PROCEDURE — G0103 PSA SCREENING: HCPCS | Performed by: NURSE PRACTITIONER

## 2018-12-11 PROCEDURE — 80053 COMPREHEN METABOLIC PANEL: CPT | Performed by: NURSE PRACTITIONER

## 2018-12-11 PROCEDURE — 80307 DRUG TEST PRSMV CHEM ANLYZR: CPT | Performed by: NURSE PRACTITIONER

## 2018-12-11 PROCEDURE — 84402 ASSAY OF FREE TESTOSTERONE: CPT | Performed by: NURSE PRACTITIONER

## 2018-12-11 PROCEDURE — 84443 ASSAY THYROID STIM HORMONE: CPT | Performed by: NURSE PRACTITIONER

## 2018-12-11 PROCEDURE — 84439 ASSAY OF FREE THYROXINE: CPT | Performed by: NURSE PRACTITIONER

## 2018-12-11 PROCEDURE — 85025 COMPLETE CBC W/AUTO DIFF WBC: CPT | Performed by: NURSE PRACTITIONER

## 2018-12-14 LAB
AMPHETAMINES SERPL QL SCN: NEGATIVE NG/ML
BARBITURATES SERPLBLD QL: NEGATIVE UG/ML
BENZODIAZ SERPL QL: NEGATIVE NG/ML
BZE BLD QL SCN: NEGATIVE NG/ML
METHADONE UR QL: NEGATIVE NG/ML
OPIATES SERPL QL SCN: NEGATIVE NG/ML
OXYCODONE SERPL-MCNC: NEGATIVE NG/ML
PCP SPEC-MCNC: NEGATIVE NG/ML
PROPOXYPH SPEC QL: NEGATIVE NG/ML
TESTOST FREE SERPL-MCNC: 12.4 PG/ML (ref 6.8–21.5)
TESTOST SERPL-MCNC: 487 NG/DL (ref 264–916)
THC SERPLBLD CFM-MCNC: NEGATIVE NG/ML

## 2019-01-02 DIAGNOSIS — F90.2 ATTENTION DEFICIT HYPERACTIVITY DISORDER (ADHD), COMBINED TYPE: ICD-10-CM

## 2019-01-02 RX ORDER — DEXTROAMPHETAMINE SACCHARATE, AMPHETAMINE ASPARTATE MONOHYDRATE, DEXTROAMPHETAMINE SULFATE AND AMPHETAMINE SULFATE 5; 5; 5; 5 MG/1; MG/1; MG/1; MG/1
20 CAPSULE, EXTENDED RELEASE ORAL EVERY MORNING
Qty: 30 CAPSULE | Refills: 0 | Status: SHIPPED | OUTPATIENT
Start: 2019-01-02 | End: 2019-01-21 | Stop reason: SDUPTHER

## 2019-01-21 DIAGNOSIS — F90.2 ATTENTION DEFICIT HYPERACTIVITY DISORDER (ADHD), COMBINED TYPE: ICD-10-CM

## 2019-01-21 RX ORDER — DEXTROAMPHETAMINE SACCHARATE, AMPHETAMINE ASPARTATE MONOHYDRATE, DEXTROAMPHETAMINE SULFATE AND AMPHETAMINE SULFATE 5; 5; 5; 5 MG/1; MG/1; MG/1; MG/1
20 CAPSULE, EXTENDED RELEASE ORAL EVERY MORNING
Qty: 30 CAPSULE | Refills: 0 | Status: SHIPPED | OUTPATIENT
Start: 2019-01-21 | End: 2019-03-12 | Stop reason: SDUPTHER

## 2019-01-21 RX ORDER — DEXTROAMPHETAMINE SACCHARATE, AMPHETAMINE ASPARTATE MONOHYDRATE, DEXTROAMPHETAMINE SULFATE AND AMPHETAMINE SULFATE 5; 5; 5; 5 MG/1; MG/1; MG/1; MG/1
20 CAPSULE, EXTENDED RELEASE ORAL EVERY MORNING
Qty: 30 CAPSULE | Refills: 0 | Status: CANCELLED | OUTPATIENT
Start: 2019-01-21

## 2019-01-22 RX ORDER — FINASTERIDE 5 MG/1
5 TABLET, FILM COATED ORAL DAILY
Qty: 90 TABLET | Refills: 3 | Status: SHIPPED | OUTPATIENT
Start: 2019-01-22 | End: 2019-03-11 | Stop reason: SDUPTHER

## 2019-01-24 RX ORDER — ACYCLOVIR 800 MG/1
800 TABLET ORAL 4 TIMES DAILY PRN
Qty: 56 TABLET | Refills: 1 | Status: SHIPPED | OUTPATIENT
Start: 2019-01-24 | End: 2020-07-09 | Stop reason: SDUPTHER

## 2019-03-11 DIAGNOSIS — F90.2 ATTENTION DEFICIT HYPERACTIVITY DISORDER (ADHD), COMBINED TYPE: ICD-10-CM

## 2019-03-11 RX ORDER — DEXTROAMPHETAMINE SACCHARATE, AMPHETAMINE ASPARTATE MONOHYDRATE, DEXTROAMPHETAMINE SULFATE AND AMPHETAMINE SULFATE 5; 5; 5; 5 MG/1; MG/1; MG/1; MG/1
20 CAPSULE, EXTENDED RELEASE ORAL EVERY MORNING
Qty: 30 CAPSULE | Refills: 0 | OUTPATIENT
Start: 2019-03-11

## 2019-03-11 RX ORDER — FINASTERIDE 5 MG/1
5 TABLET, FILM COATED ORAL DAILY
Qty: 90 TABLET | Refills: 3 | Status: SHIPPED | OUTPATIENT
Start: 2019-03-11 | End: 2020-02-19 | Stop reason: SDUPTHER

## 2019-03-12 ENCOUNTER — OFFICE VISIT (OUTPATIENT)
Dept: FAMILY MEDICINE CLINIC | Facility: CLINIC | Age: 44
End: 2019-03-12

## 2019-03-12 VITALS
OXYGEN SATURATION: 98 % | HEART RATE: 80 BPM | TEMPERATURE: 97.3 F | HEIGHT: 69 IN | WEIGHT: 175 LBS | BODY MASS INDEX: 25.92 KG/M2 | DIASTOLIC BLOOD PRESSURE: 82 MMHG | SYSTOLIC BLOOD PRESSURE: 124 MMHG

## 2019-03-12 DIAGNOSIS — F90.2 ATTENTION DEFICIT HYPERACTIVITY DISORDER (ADHD), COMBINED TYPE: Primary | ICD-10-CM

## 2019-03-12 DIAGNOSIS — F90.2 ATTENTION DEFICIT HYPERACTIVITY DISORDER (ADHD), COMBINED TYPE: ICD-10-CM

## 2019-03-12 DIAGNOSIS — M54.2 NECK PAIN: ICD-10-CM

## 2019-03-12 DIAGNOSIS — M25.562 LEFT KNEE PAIN, UNSPECIFIED CHRONICITY: ICD-10-CM

## 2019-03-12 PROCEDURE — 99214 OFFICE O/P EST MOD 30 MIN: CPT | Performed by: NURSE PRACTITIONER

## 2019-03-12 RX ORDER — DEXTROAMPHETAMINE SACCHARATE, AMPHETAMINE ASPARTATE MONOHYDRATE, DEXTROAMPHETAMINE SULFATE AND AMPHETAMINE SULFATE 5; 5; 5; 5 MG/1; MG/1; MG/1; MG/1
20 CAPSULE, EXTENDED RELEASE ORAL EVERY MORNING
Qty: 30 CAPSULE | Refills: 0 | Status: SHIPPED | OUTPATIENT
Start: 2019-03-12 | End: 2019-04-12 | Stop reason: SDUPTHER

## 2019-03-14 ENCOUNTER — TELEPHONE (OUTPATIENT)
Dept: FAMILY MEDICINE CLINIC | Facility: CLINIC | Age: 44
End: 2019-03-14

## 2019-03-14 NOTE — TELEPHONE ENCOUNTER
Left a message for the patient with his XR results. I left my phone number for him to call if the pain persists and he needs an MRI.    ----- Message from CEFERINO Loo sent at 3/13/2019  1:46 PM CDT -----  Pls inform neck xray does not show anything, will send for MRI if pain continues.

## 2019-03-28 PROBLEM — M25.562 LEFT KNEE PAIN: Status: ACTIVE | Noted: 2019-03-28

## 2019-03-28 PROBLEM — F90.2 ATTENTION DEFICIT HYPERACTIVITY DISORDER (ADHD), COMBINED TYPE: Status: ACTIVE | Noted: 2019-03-28

## 2019-03-28 PROBLEM — M54.2 NECK PAIN: Status: ACTIVE | Noted: 2019-03-28

## 2019-03-29 NOTE — PROGRESS NOTES
Subjective   Howard Cotton is a 43 y.o. male who presents to the office for ADHD follow-up.  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  Also having LEFT knee and cervical spine pain.    History of Present Illness     The following portions of the patient's history were reviewed and updated as appropriate: allergies, current medications, past family history, past medical history, past social history, past surgical history and problem list.    Review of Systems   Constitutional: Negative for chills, fatigue and fever.   HENT: Negative for congestion, sneezing, sore throat and trouble swallowing.    Eyes: Negative for visual disturbance.   Respiratory: Negative for cough, chest tightness, shortness of breath and wheezing.    Cardiovascular: Negative for chest pain, palpitations and leg swelling.   Gastrointestinal: Negative for abdominal pain, constipation, diarrhea, nausea and vomiting.   Genitourinary: Negative for dysuria, frequency and urgency.   Musculoskeletal: Positive for arthralgias and neck pain.   Skin: Negative for rash.   Neurological: Negative for dizziness, weakness and headaches.   Psychiatric/Behavioral: Positive for decreased concentration.        In the past two weeks the pt has not felt down, depressed, hopeless or lost interest in doing things   All other systems reviewed and are negative.      Objective   Physical Exam   Constitutional: He is oriented to person, place, and time. He appears well-developed and well-nourished. He is cooperative.  Non-toxic appearance. He does not have a sickly appearance. No distress.   HENT:   Head: Normocephalic and atraumatic.   Right Ear: External ear normal.   Left Ear: External ear normal.   Nose: Nose normal.   Mouth/Throat: Oropharynx is clear and moist.   Eyes: Conjunctivae and EOM are normal. Pupils are equal, round, and reactive to light. Right eye exhibits no discharge. Left  eye exhibits no discharge. No scleral icterus.   Neck: Normal range of motion. Neck supple. No thyromegaly present.   Cardiovascular: Normal rate, regular rhythm, normal heart sounds and intact distal pulses. Exam reveals no gallop and no friction rub.   No murmur heard.  Pulmonary/Chest: Effort normal and breath sounds normal. No respiratory distress. He has no wheezes. He has no rales.   Abdominal: Soft. Bowel sounds are normal. He exhibits no distension. There is no tenderness. There is no rebound and no guarding.   Musculoskeletal: He exhibits no edema.        Left knee: He exhibits decreased range of motion and swelling.        Cervical back: He exhibits decreased range of motion and tenderness.   Lymphadenopathy:     He has no cervical adenopathy.   Neurological: He is alert and oriented to person, place, and time. No cranial nerve deficit. GCS eye subscore is 4. GCS verbal subscore is 5. GCS motor subscore is 6.   Skin: Skin is warm and dry. No rash noted.   Psychiatric: He has a normal mood and affect. His speech is normal and behavior is normal. Judgment and thought content normal. Cognition and memory are normal.   Nursing note and vitals reviewed.      Assessment/Plan   Howard was seen today for adhd.    Diagnoses and all orders for this visit:    Attention deficit hyperactivity disorder (ADHD), combined type    Neck pain  -     XR Spine Cervical 2 or 3 View; Future    Left knee pain, unspecified chronicity  -     XR Knee 3 View Left; Future    Encouraged to continue with meds as ordered.  Tells me he is thinking about stopping the Adderall.    Alternate ice/heat to neck and knee.  May take OTC pain reliever.  Updated controlled contract and KEITH.     Patient understands the risks associated with this controlled medication, including tolerance and addiction.  Patient also agrees to only obtain this medication from me, and not from a another provider, unless that provider is covering for me in my  absence.  Patient also agrees to be compliant in dosing, and not self adjust the dose of medication.  A signed controlled substance agreement is on file, and the patient has received a controlled substance education sheet at this a previous visit.  The patient has also signed a consent for treatment with a controlled substance as per [facility] policy. KEITH was obtained.  Reviewed # [id number].

## 2019-04-12 DIAGNOSIS — F90.2 ATTENTION DEFICIT HYPERACTIVITY DISORDER (ADHD), COMBINED TYPE: ICD-10-CM

## 2019-04-12 RX ORDER — DEXTROAMPHETAMINE SACCHARATE, AMPHETAMINE ASPARTATE MONOHYDRATE, DEXTROAMPHETAMINE SULFATE AND AMPHETAMINE SULFATE 5; 5; 5; 5 MG/1; MG/1; MG/1; MG/1
20 CAPSULE, EXTENDED RELEASE ORAL EVERY MORNING
Qty: 30 CAPSULE | Refills: 0 | OUTPATIENT
Start: 2019-04-12

## 2019-04-12 RX ORDER — DEXTROAMPHETAMINE SACCHARATE, AMPHETAMINE ASPARTATE MONOHYDRATE, DEXTROAMPHETAMINE SULFATE AND AMPHETAMINE SULFATE 5; 5; 5; 5 MG/1; MG/1; MG/1; MG/1
20 CAPSULE, EXTENDED RELEASE ORAL EVERY MORNING
Qty: 30 CAPSULE | Refills: 0 | Status: SHIPPED | OUTPATIENT
Start: 2019-04-12 | End: 2019-05-13 | Stop reason: SDUPTHER

## 2019-05-13 DIAGNOSIS — F90.2 ATTENTION DEFICIT HYPERACTIVITY DISORDER (ADHD), COMBINED TYPE: ICD-10-CM

## 2019-05-13 RX ORDER — DEXTROAMPHETAMINE SACCHARATE, AMPHETAMINE ASPARTATE MONOHYDRATE, DEXTROAMPHETAMINE SULFATE AND AMPHETAMINE SULFATE 5; 5; 5; 5 MG/1; MG/1; MG/1; MG/1
20 CAPSULE, EXTENDED RELEASE ORAL EVERY MORNING
Qty: 30 CAPSULE | Refills: 0 | Status: SHIPPED | OUTPATIENT
Start: 2019-05-13 | End: 2019-06-19 | Stop reason: SDUPTHER

## 2019-05-13 RX ORDER — DEXTROAMPHETAMINE SACCHARATE, AMPHETAMINE ASPARTATE MONOHYDRATE, DEXTROAMPHETAMINE SULFATE AND AMPHETAMINE SULFATE 5; 5; 5; 5 MG/1; MG/1; MG/1; MG/1
20 CAPSULE, EXTENDED RELEASE ORAL EVERY MORNING
Qty: 30 CAPSULE | Refills: 0 | OUTPATIENT
Start: 2019-05-13

## 2019-05-13 RX ORDER — DEXTROAMPHETAMINE SACCHARATE, AMPHETAMINE ASPARTATE MONOHYDRATE, DEXTROAMPHETAMINE SULFATE AND AMPHETAMINE SULFATE 5; 5; 5; 5 MG/1; MG/1; MG/1; MG/1
20 CAPSULE, EXTENDED RELEASE ORAL EVERY MORNING
Qty: 30 CAPSULE | Refills: 0 | Status: CANCELLED | OUTPATIENT
Start: 2019-05-13

## 2019-06-19 DIAGNOSIS — F90.2 ATTENTION DEFICIT HYPERACTIVITY DISORDER (ADHD), COMBINED TYPE: ICD-10-CM

## 2019-06-19 RX ORDER — DEXTROAMPHETAMINE SACCHARATE, AMPHETAMINE ASPARTATE MONOHYDRATE, DEXTROAMPHETAMINE SULFATE AND AMPHETAMINE SULFATE 5; 5; 5; 5 MG/1; MG/1; MG/1; MG/1
20 CAPSULE, EXTENDED RELEASE ORAL EVERY MORNING
Qty: 30 CAPSULE | Refills: 0 | Status: SHIPPED | OUTPATIENT
Start: 2019-06-19 | End: 2019-07-22 | Stop reason: SDUPTHER

## 2019-07-22 ENCOUNTER — OFFICE VISIT (OUTPATIENT)
Dept: FAMILY MEDICINE CLINIC | Facility: CLINIC | Age: 44
End: 2019-07-22

## 2019-07-22 VITALS
SYSTOLIC BLOOD PRESSURE: 126 MMHG | DIASTOLIC BLOOD PRESSURE: 70 MMHG | BODY MASS INDEX: 27.25 KG/M2 | TEMPERATURE: 98.4 F | HEIGHT: 69 IN | WEIGHT: 184 LBS | HEART RATE: 97 BPM | OXYGEN SATURATION: 98 %

## 2019-07-22 DIAGNOSIS — F90.2 ATTENTION DEFICIT HYPERACTIVITY DISORDER (ADHD), COMBINED TYPE: ICD-10-CM

## 2019-07-22 DIAGNOSIS — F90.2 ATTENTION DEFICIT HYPERACTIVITY DISORDER (ADHD), COMBINED TYPE: Primary | ICD-10-CM

## 2019-07-22 PROCEDURE — 99213 OFFICE O/P EST LOW 20 MIN: CPT | Performed by: NURSE PRACTITIONER

## 2019-07-22 RX ORDER — DEXTROAMPHETAMINE SACCHARATE, AMPHETAMINE ASPARTATE MONOHYDRATE, DEXTROAMPHETAMINE SULFATE AND AMPHETAMINE SULFATE 5; 5; 5; 5 MG/1; MG/1; MG/1; MG/1
20 CAPSULE, EXTENDED RELEASE ORAL EVERY MORNING
Qty: 30 CAPSULE | Refills: 0 | Status: CANCELLED | OUTPATIENT
Start: 2019-07-22

## 2019-07-22 RX ORDER — DEXTROAMPHETAMINE SACCHARATE, AMPHETAMINE ASPARTATE MONOHYDRATE, DEXTROAMPHETAMINE SULFATE AND AMPHETAMINE SULFATE 5; 5; 5; 5 MG/1; MG/1; MG/1; MG/1
20 CAPSULE, EXTENDED RELEASE ORAL EVERY MORNING
Qty: 30 CAPSULE | Refills: 0 | Status: SHIPPED | OUTPATIENT
Start: 2019-07-22 | End: 2019-08-20 | Stop reason: SDUPTHER

## 2019-07-24 NOTE — PROGRESS NOTES
Subjective   Howard Cotton is a 43 y.o. male who presents to the office for ADHD follow-up.  Tells me that his symptoms are lessened enough that he is able to concentrate on the multiple tasks at his office when he takes his medication.  He would like to continue with the Adderall.  History of Present Illness     The following portions of the patient's history were reviewed and updated as appropriate: allergies, current medications, past family history, past medical history, past social history, past surgical history and problem list.    Review of Systems   Constitutional: Negative for chills, fatigue and fever.   HENT: Negative for congestion, sneezing, sore throat and trouble swallowing.    Eyes: Negative for visual disturbance.   Respiratory: Negative for cough, chest tightness, shortness of breath and wheezing.    Cardiovascular: Negative for chest pain, palpitations and leg swelling.   Gastrointestinal: Negative for abdominal pain, constipation, diarrhea, nausea and vomiting.   Genitourinary: Negative for dysuria, frequency and urgency.   Musculoskeletal: Negative for neck pain.   Skin: Negative for rash.   Neurological: Negative for dizziness, weakness and headaches.   Psychiatric/Behavioral: Positive for decreased concentration.        In the past two weeks the pt has not felt down, depressed, hopeless or lost interest in doing things   All other systems reviewed and are negative.      Objective   Physical Exam   Constitutional: He is oriented to person, place, and time. He appears well-developed and well-nourished.   HENT:   Head: Normocephalic and atraumatic.   Right Ear: External ear normal.   Left Ear: External ear normal.   Nose: Nose normal.   Mouth/Throat: Oropharynx is clear and moist.   Eyes: Conjunctivae and EOM are normal. Pupils are equal, round, and reactive to light. Right eye exhibits no discharge. Left eye exhibits no discharge. No scleral icterus.   Neck: Normal range of motion. Neck  supple. No thyromegaly present.   Cardiovascular: Normal rate, regular rhythm, normal heart sounds and intact distal pulses. Exam reveals no gallop and no friction rub.   No murmur heard.  Pulmonary/Chest: Effort normal and breath sounds normal. No respiratory distress. He has no wheezes. He has no rales.   Abdominal: Soft. Bowel sounds are normal. He exhibits no distension. There is no tenderness. There is no rebound and no guarding.   Musculoskeletal: Normal range of motion. He exhibits no edema.   Lymphadenopathy:     He has no cervical adenopathy.   Neurological: He is alert and oriented to person, place, and time. No cranial nerve deficit. GCS eye subscore is 4. GCS verbal subscore is 5. GCS motor subscore is 6.   Skin: Skin is warm, dry and intact. Capillary refill takes less than 2 seconds. No rash noted.   Psychiatric: He has a normal mood and affect. His speech is normal and behavior is normal. Judgment and thought content normal. Cognition and memory are normal.   Nursing note and vitals reviewed.      Assessment/Plan   Howard was seen today for adhd.    Diagnoses and all orders for this visit:    Attention deficit hyperactivity disorder (ADHD), combined type    Encouraged meds as ordered.     Patient understands the risks associated with this controlled medication, including tolerance and addiction.  Patient also agrees to only obtain this medication from me, and not from a another provider, unless that provider is covering for me in my absence.  Patient also agrees to be compliant in dosing, and not self adjust the dose of medication.  A signed controlled substance agreement is on file, and the patient has received a controlled substance education sheet at this a previous visit.  The patient has also signed a consent for treatment with a controlled substance as per [facility] policy. KEITH was obtained.  Reviewed # [id number].

## 2019-08-20 DIAGNOSIS — F90.2 ATTENTION DEFICIT HYPERACTIVITY DISORDER (ADHD), COMBINED TYPE: ICD-10-CM

## 2019-08-20 RX ORDER — DEXTROAMPHETAMINE SACCHARATE, AMPHETAMINE ASPARTATE MONOHYDRATE, DEXTROAMPHETAMINE SULFATE AND AMPHETAMINE SULFATE 5; 5; 5; 5 MG/1; MG/1; MG/1; MG/1
20 CAPSULE, EXTENDED RELEASE ORAL EVERY MORNING
Qty: 30 CAPSULE | Refills: 0 | OUTPATIENT
Start: 2019-08-20

## 2019-08-20 RX ORDER — DEXTROAMPHETAMINE SACCHARATE, AMPHETAMINE ASPARTATE MONOHYDRATE, DEXTROAMPHETAMINE SULFATE AND AMPHETAMINE SULFATE 5; 5; 5; 5 MG/1; MG/1; MG/1; MG/1
20 CAPSULE, EXTENDED RELEASE ORAL EVERY MORNING
Qty: 30 CAPSULE | Refills: 0 | Status: SHIPPED | OUTPATIENT
Start: 2019-08-20 | End: 2019-09-20 | Stop reason: SDUPTHER

## 2019-09-20 DIAGNOSIS — F90.2 ATTENTION DEFICIT HYPERACTIVITY DISORDER (ADHD), COMBINED TYPE: ICD-10-CM

## 2019-09-20 RX ORDER — DEXTROAMPHETAMINE SACCHARATE, AMPHETAMINE ASPARTATE MONOHYDRATE, DEXTROAMPHETAMINE SULFATE AND AMPHETAMINE SULFATE 5; 5; 5; 5 MG/1; MG/1; MG/1; MG/1
20 CAPSULE, EXTENDED RELEASE ORAL EVERY MORNING
Qty: 30 CAPSULE | Refills: 0 | Status: SHIPPED | OUTPATIENT
Start: 2019-09-20 | End: 2019-10-21 | Stop reason: SDUPTHER

## 2019-10-21 ENCOUNTER — OFFICE VISIT (OUTPATIENT)
Dept: FAMILY MEDICINE CLINIC | Facility: CLINIC | Age: 44
End: 2019-10-21

## 2019-10-21 VITALS
OXYGEN SATURATION: 99 % | BODY MASS INDEX: 26.51 KG/M2 | HEIGHT: 69 IN | HEART RATE: 103 BPM | DIASTOLIC BLOOD PRESSURE: 64 MMHG | WEIGHT: 179 LBS | SYSTOLIC BLOOD PRESSURE: 120 MMHG

## 2019-10-21 DIAGNOSIS — Z12.5 PROSTATE CANCER SCREENING: ICD-10-CM

## 2019-10-21 DIAGNOSIS — R53.83 FATIGUE, UNSPECIFIED TYPE: ICD-10-CM

## 2019-10-21 DIAGNOSIS — F90.2 ATTENTION DEFICIT HYPERACTIVITY DISORDER (ADHD), COMBINED TYPE: Primary | ICD-10-CM

## 2019-10-21 DIAGNOSIS — F90.2 ATTENTION DEFICIT HYPERACTIVITY DISORDER (ADHD), COMBINED TYPE: ICD-10-CM

## 2019-10-21 DIAGNOSIS — Z00.00 GENERAL MEDICAL EXAM: ICD-10-CM

## 2019-10-21 DIAGNOSIS — Z23 NEED FOR INFLUENZA VACCINATION: ICD-10-CM

## 2019-10-21 PROBLEM — G47.19 EXCESSIVE DAYTIME SLEEPINESS: Status: ACTIVE | Noted: 2019-10-21

## 2019-10-21 PROCEDURE — 99213 OFFICE O/P EST LOW 20 MIN: CPT | Performed by: NURSE PRACTITIONER

## 2019-10-21 PROCEDURE — 90471 IMMUNIZATION ADMIN: CPT | Performed by: NURSE PRACTITIONER

## 2019-10-21 PROCEDURE — 90674 CCIIV4 VAC NO PRSV 0.5 ML IM: CPT | Performed by: NURSE PRACTITIONER

## 2019-10-21 RX ORDER — DEXTROAMPHETAMINE SACCHARATE, AMPHETAMINE ASPARTATE MONOHYDRATE, DEXTROAMPHETAMINE SULFATE AND AMPHETAMINE SULFATE 5; 5; 5; 5 MG/1; MG/1; MG/1; MG/1
20 CAPSULE, EXTENDED RELEASE ORAL EVERY MORNING
Qty: 30 CAPSULE | Refills: 0 | Status: SHIPPED | OUTPATIENT
Start: 2019-10-21 | End: 2019-11-22 | Stop reason: SDUPTHER

## 2019-11-04 PROBLEM — Z12.5 PROSTATE CANCER SCREENING: Status: ACTIVE | Noted: 2019-11-04

## 2019-11-05 NOTE — PROGRESS NOTES
Subjective   Howard Cotton is a 43 y.o. male who presents to the office for ADHD follow-up which will include a medication refill.  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  History of Present Illness     The following portions of the patient's history were reviewed and updated as appropriate: allergies, current medications, past family history, past medical history, past social history, past surgical history and problem list.    Review of Systems   Constitutional: Negative for chills, fatigue and fever.   HENT: Negative for congestion, sneezing, sore throat and trouble swallowing.    Eyes: Negative for visual disturbance.   Respiratory: Negative for cough, chest tightness, shortness of breath and wheezing.    Cardiovascular: Negative for chest pain, palpitations and leg swelling.   Gastrointestinal: Negative for abdominal pain, constipation, diarrhea, nausea and vomiting.   Genitourinary: Negative for dysuria, frequency and urgency.   Musculoskeletal: Negative for neck pain.   Skin: Negative for rash.   Neurological: Negative for dizziness, weakness and headaches.   Psychiatric/Behavioral: Positive for decreased concentration.        In the past two weeks the pt has not felt down, depressed, hopeless or lost interest in doing things   All other systems reviewed and are negative.      Objective   Physical Exam   Constitutional: He is oriented to person, place, and time. He appears well-developed and well-nourished. He is cooperative.  Non-toxic appearance. No distress.   HENT:   Head: Normocephalic and atraumatic.   Right Ear: External ear normal.   Left Ear: External ear normal.   Nose: Nose normal.   Mouth/Throat: Oropharynx is clear and moist.   Eyes: Conjunctivae and EOM are normal. Pupils are equal, round, and reactive to light. Right eye exhibits no discharge. Left eye exhibits no discharge. No scleral icterus.   Neck: Normal  range of motion. Neck supple. No thyromegaly present.   Cardiovascular: Normal rate, regular rhythm, normal heart sounds and intact distal pulses. Exam reveals no gallop and no friction rub.   No murmur heard.  Pulmonary/Chest: Effort normal and breath sounds normal. No respiratory distress. He has no wheezes. He has no rales.   Abdominal: Soft. Bowel sounds are normal. He exhibits no distension. There is no tenderness. There is no rebound and no guarding.   Musculoskeletal: Normal range of motion. He exhibits no edema.   Lymphadenopathy:     He has no cervical adenopathy.   Neurological: He is alert and oriented to person, place, and time. No cranial nerve deficit. GCS eye subscore is 4. GCS verbal subscore is 5. GCS motor subscore is 6.   Skin: Skin is warm and dry. No rash noted.   Psychiatric: He has a normal mood and affect. His speech is normal and behavior is normal. Judgment and thought content normal. Cognition and memory are normal.   Nursing note and vitals reviewed.      Assessment/Plan   Howard was seen today for adhd.    Diagnoses and all orders for this visit:    Attention deficit hyperactivity disorder (ADHD), combined type    Need for influenza vaccination  -     Flucelvax Quad=>4Years (PFS)    General medical exam  -     CBC & Differential; Future  -     Comprehensive Metabolic Panel; Future  -     T4, Free; Future  -     TSH; Future  -     Urinalysis With Culture If Indicated -; Future  -     Lipid Panel; Future  -     Hemoglobin A1c; Future    Fatigue, unspecified type  -     Vitamin B12; Future  -     Vitamin D 25 Hydroxy; Future  -     Ferritin; Future  -     Folate; Future  -     Iron; Future    Prostate cancer screening  -     PSA Screen; Future    Encouraged to continue with meds as ordered.  Will need fasting and fatigue labs prior to next appt.     Patient understands the risks associated with this controlled medication, including tolerance and addiction.  Patient also agrees to only  obtain this medication from me, and not from a another provider, unless that provider is covering for me in my absence.  Patient also agrees to be compliant in dosing, and not self adjust the dose of medication.  A signed controlled substance agreement is on file, and the patient has received a controlled substance education sheet at this a previous visit.  The patient has also signed a consent for treatment with a controlled substance as per [facility] policy. KEITH was obtained.  Reviewed # [id number].

## 2019-11-22 DIAGNOSIS — F90.2 ATTENTION DEFICIT HYPERACTIVITY DISORDER (ADHD), COMBINED TYPE: ICD-10-CM

## 2019-11-22 RX ORDER — DEXTROAMPHETAMINE SACCHARATE, AMPHETAMINE ASPARTATE MONOHYDRATE, DEXTROAMPHETAMINE SULFATE AND AMPHETAMINE SULFATE 5; 5; 5; 5 MG/1; MG/1; MG/1; MG/1
20 CAPSULE, EXTENDED RELEASE ORAL EVERY MORNING
Qty: 30 CAPSULE | Refills: 0 | Status: SHIPPED | OUTPATIENT
Start: 2019-11-22 | End: 2020-04-20 | Stop reason: SDUPTHER

## 2020-02-19 RX ORDER — FINASTERIDE 5 MG/1
5 TABLET, FILM COATED ORAL DAILY
Qty: 90 TABLET | Refills: 1 | Status: SHIPPED | OUTPATIENT
Start: 2020-02-19 | End: 2020-07-09 | Stop reason: SDUPTHER

## 2020-04-20 ENCOUNTER — TELEMEDICINE (OUTPATIENT)
Dept: FAMILY MEDICINE CLINIC | Facility: CLINIC | Age: 45
End: 2020-04-20

## 2020-04-20 DIAGNOSIS — F90.2 ATTENTION DEFICIT HYPERACTIVITY DISORDER (ADHD), COMBINED TYPE: Primary | ICD-10-CM

## 2020-04-20 DIAGNOSIS — F90.2 ATTENTION DEFICIT HYPERACTIVITY DISORDER (ADHD), COMBINED TYPE: ICD-10-CM

## 2020-04-20 DIAGNOSIS — Z79.899 LONG TERM CURRENT USE OF THERAPEUTIC DRUG: ICD-10-CM

## 2020-04-20 PROCEDURE — 99213 OFFICE O/P EST LOW 20 MIN: CPT | Performed by: NURSE PRACTITIONER

## 2020-04-20 RX ORDER — DEXTROAMPHETAMINE SACCHARATE, AMPHETAMINE ASPARTATE MONOHYDRATE, DEXTROAMPHETAMINE SULFATE AND AMPHETAMINE SULFATE 5; 5; 5; 5 MG/1; MG/1; MG/1; MG/1
20 CAPSULE, EXTENDED RELEASE ORAL EVERY MORNING
Qty: 30 CAPSULE | Refills: 0 | Status: SHIPPED | OUTPATIENT
Start: 2020-04-20 | End: 2020-05-20 | Stop reason: SDUPTHER

## 2020-04-21 PROBLEM — Z79.899 LONG TERM CURRENT USE OF THERAPEUTIC DRUG: Status: ACTIVE | Noted: 2020-04-21

## 2020-04-21 NOTE — PROGRESS NOTES
Subjective   Howard Cotton is a 44 y.o. male who requires a controlled medication follow-up.  Tells me that he has not taken Adderall in several months and that his concentration, memory is not where he wants it to be. Has taken Adderall for some time and would like to get back on it, denies adverse side effects while taking this medication.  Is a nationwide  and active in local community events.    You have chosen to receive care through a telehealth visit.  Do you consent to use a video/audio connection for your medical care today? Yes  History of Present Illness     The following portions of the patient's history were reviewed and updated as appropriate: allergies, current medications, past family history, past medical history, past social history, past surgical history and problem list.    Review of Systems   Constitutional: Negative for chills, fatigue and fever.   HENT: Negative for congestion, sneezing, sore throat and trouble swallowing.    Eyes: Negative for visual disturbance.   Respiratory: Negative for cough, chest tightness, shortness of breath and wheezing.    Cardiovascular: Negative for chest pain, palpitations and leg swelling.   Gastrointestinal: Negative for abdominal pain, constipation, diarrhea, nausea and vomiting.   Genitourinary: Negative for dysuria, frequency and urgency.   Musculoskeletal: Negative for neck pain.   Skin: Negative for rash.   Neurological: Negative for dizziness, weakness and headaches.   Psychiatric/Behavioral: Positive for decreased concentration.        In the past two weeks the pt has not felt down, depressed, hopeless or lost interest in doing things   All other systems reviewed and are negative.      Objective   Physical Exam   Constitutional: He is oriented to person, place, and time. He appears well-developed and well-nourished. No distress.   HENT:   Head: Normocephalic and atraumatic.   Right Ear: External ear normal.   Left Ear: External ear  normal.   Eyes: EOM are normal.   Neck: Normal range of motion.   Pulmonary/Chest: Effort normal.   Musculoskeletal: Normal range of motion.   Neurological: He is alert and oriented to person, place, and time. No cranial nerve deficit. GCS eye subscore is 4. GCS verbal subscore is 5. GCS motor subscore is 6.   Psychiatric: He has a normal mood and affect. His speech is normal and behavior is normal. Judgment and thought content normal. His mood appears not anxious. Cognition and memory are normal. He does not exhibit a depressed mood. He expresses no homicidal and no suicidal ideation. He expresses no suicidal plans and no homicidal plans.   Dressed appropriately for weather and situation, makes eye contact and engages in conversation       Assessment/Plan   Diagnoses and all orders for this visit:    Attention deficit hyperactivity disorder (ADHD), combined type    Long term current use of therapeutic drug    Will start back on Adderall for him to take as previously ordered.  Will RTC in three months for followup.     Patient understands the risks associated with this controlled medication, including tolerance and addiction.  Patient also agrees to only obtain this medication from me, and not from a another provider, unless that provider is covering for me in my absence.  Patient also agrees to be compliant in dosing, and not self adjust the dose of medication.  A signed controlled substance agreement is on file, and the patient has received a controlled substance education sheet at this a previous visit.  The patient has also signed a consent for treatment with a controlled substance as per [facility] policy. KEITH was obtained.  Reviewed # [id number].

## 2020-05-20 DIAGNOSIS — F90.2 ATTENTION DEFICIT HYPERACTIVITY DISORDER (ADHD), COMBINED TYPE: ICD-10-CM

## 2020-05-20 RX ORDER — DEXTROAMPHETAMINE SACCHARATE, AMPHETAMINE ASPARTATE MONOHYDRATE, DEXTROAMPHETAMINE SULFATE AND AMPHETAMINE SULFATE 5; 5; 5; 5 MG/1; MG/1; MG/1; MG/1
20 CAPSULE, EXTENDED RELEASE ORAL EVERY MORNING
Qty: 30 CAPSULE | Refills: 0 | Status: SHIPPED | OUTPATIENT
Start: 2020-05-20 | End: 2020-06-19 | Stop reason: SDUPTHER

## 2020-06-19 DIAGNOSIS — F90.2 ATTENTION DEFICIT HYPERACTIVITY DISORDER (ADHD), COMBINED TYPE: ICD-10-CM

## 2020-06-19 RX ORDER — DEXTROAMPHETAMINE SACCHARATE, AMPHETAMINE ASPARTATE MONOHYDRATE, DEXTROAMPHETAMINE SULFATE AND AMPHETAMINE SULFATE 5; 5; 5; 5 MG/1; MG/1; MG/1; MG/1
20 CAPSULE, EXTENDED RELEASE ORAL EVERY MORNING
Qty: 30 CAPSULE | Refills: 0 | Status: SHIPPED | OUTPATIENT
Start: 2020-06-19 | End: 2020-07-21 | Stop reason: SDUPTHER

## 2020-07-10 RX ORDER — ACYCLOVIR 800 MG/1
800 TABLET ORAL 4 TIMES DAILY PRN
Qty: 56 TABLET | Refills: 1 | Status: SHIPPED | OUTPATIENT
Start: 2020-07-10 | End: 2022-10-17 | Stop reason: SDUPTHER

## 2020-07-10 RX ORDER — FINASTERIDE 5 MG/1
5 TABLET, FILM COATED ORAL DAILY
Qty: 90 TABLET | Refills: 1 | Status: SHIPPED | OUTPATIENT
Start: 2020-07-10 | End: 2021-01-27 | Stop reason: SDUPTHER

## 2020-07-21 ENCOUNTER — OFFICE VISIT (OUTPATIENT)
Dept: FAMILY MEDICINE CLINIC | Facility: CLINIC | Age: 45
End: 2020-07-21

## 2020-07-21 DIAGNOSIS — F90.2 ATTENTION DEFICIT HYPERACTIVITY DISORDER (ADHD), COMBINED TYPE: ICD-10-CM

## 2020-07-21 DIAGNOSIS — F90.2 ATTENTION DEFICIT HYPERACTIVITY DISORDER (ADHD), COMBINED TYPE: Primary | ICD-10-CM

## 2020-07-21 PROCEDURE — 99442 PR PHYS/QHP TELEPHONE EVALUATION 11-20 MIN: CPT | Performed by: NURSE PRACTITIONER

## 2020-07-21 RX ORDER — DEXTROAMPHETAMINE SACCHARATE, AMPHETAMINE ASPARTATE MONOHYDRATE, DEXTROAMPHETAMINE SULFATE AND AMPHETAMINE SULFATE 5; 5; 5; 5 MG/1; MG/1; MG/1; MG/1
20 CAPSULE, EXTENDED RELEASE ORAL EVERY MORNING
Qty: 30 CAPSULE | Refills: 0 | Status: SHIPPED | OUTPATIENT
Start: 2020-07-21 | End: 2020-08-18 | Stop reason: SDUPTHER

## 2020-07-21 NOTE — PROGRESS NOTES
Subjective   Howard Cotton is a 44 y.o. male who presents to the office for ADD follow-up.  Takes Adderall for this issue which keeps him on task at work and he is able to complete job responsibilities.  Denies adverse side effects.  You have chosen to receive care through a telephone visit. Do you consent to use a telephone visit for your medical care today? Yes  I have spent 12 minutes in discussion with this patient.  History of Present Illness     The following portions of the patient's history were reviewed and updated as appropriate: allergies, current medications, past family history, past medical history, past social history, past surgical history and problem list.    Review of Systems   Constitutional: Negative for chills, fatigue and fever.   HENT: Negative for congestion, sneezing, sore throat and trouble swallowing.    Eyes: Negative for visual disturbance.   Respiratory: Negative for cough, chest tightness, shortness of breath and wheezing.    Cardiovascular: Negative for chest pain, palpitations and leg swelling.   Gastrointestinal: Negative for abdominal pain, constipation, diarrhea, nausea and vomiting.   Genitourinary: Negative for dysuria, frequency and urgency.   Musculoskeletal: Negative for neck pain.   Skin: Negative for rash.   Neurological: Negative for dizziness, weakness and headaches.   Psychiatric/Behavioral: Positive for decreased concentration.        In the past two weeks the pt has not felt down, depressed, hopeless or lost interest in doing things   All other systems reviewed and are negative.      Objective   Physical Exam   Constitutional: He is oriented to person, place, and time. No distress.   Pulmonary/Chest: Effort normal.   Neurological: He is alert and oriented to person, place, and time.       Assessment/Plan   Howard was seen today for med refill.    Diagnoses and all orders for this visit:    Attention deficit hyperactivity disorder (ADHD), combined  type    Encouraged medications as ordered.     Patient understands the risks associated with this controlled medication, including tolerance and addiction.  Patient also agrees to only obtain this medication from me, and not from a another provider, unless that provider is covering for me in my absence.  Patient also agrees to be compliant in dosing, and not self adjust the dose of medication.  A signed controlled substance agreement is on file, and the patient has received a controlled substance education sheet at this a previous visit.  The patient has also signed a consent for treatment with a controlled substance as per [facility] policy. KEITH was obtained, reviewed and scanned.

## 2020-08-18 DIAGNOSIS — F90.2 ATTENTION DEFICIT HYPERACTIVITY DISORDER (ADHD), COMBINED TYPE: ICD-10-CM

## 2020-08-18 RX ORDER — DEXTROAMPHETAMINE SACCHARATE, AMPHETAMINE ASPARTATE MONOHYDRATE, DEXTROAMPHETAMINE SULFATE AND AMPHETAMINE SULFATE 5; 5; 5; 5 MG/1; MG/1; MG/1; MG/1
20 CAPSULE, EXTENDED RELEASE ORAL EVERY MORNING
Qty: 30 CAPSULE | Refills: 0 | OUTPATIENT
Start: 2020-08-18

## 2020-08-19 DIAGNOSIS — F90.2 ATTENTION DEFICIT HYPERACTIVITY DISORDER (ADHD), COMBINED TYPE: ICD-10-CM

## 2020-08-19 RX ORDER — DEXTROAMPHETAMINE SACCHARATE, AMPHETAMINE ASPARTATE MONOHYDRATE, DEXTROAMPHETAMINE SULFATE AND AMPHETAMINE SULFATE 5; 5; 5; 5 MG/1; MG/1; MG/1; MG/1
20 CAPSULE, EXTENDED RELEASE ORAL EVERY MORNING
Qty: 30 CAPSULE | Refills: 0 | Status: SHIPPED | OUTPATIENT
Start: 2020-08-19 | End: 2020-08-19 | Stop reason: SDUPTHER

## 2020-08-19 RX ORDER — DEXTROAMPHETAMINE SACCHARATE, AMPHETAMINE ASPARTATE MONOHYDRATE, DEXTROAMPHETAMINE SULFATE AND AMPHETAMINE SULFATE 5; 5; 5; 5 MG/1; MG/1; MG/1; MG/1
20 CAPSULE, EXTENDED RELEASE ORAL EVERY MORNING
Qty: 30 CAPSULE | Refills: 0 | Status: SHIPPED | OUTPATIENT
Start: 2020-08-19 | End: 2020-09-18 | Stop reason: SDUPTHER

## 2020-09-18 DIAGNOSIS — F90.2 ATTENTION DEFICIT HYPERACTIVITY DISORDER (ADHD), COMBINED TYPE: ICD-10-CM

## 2020-09-18 RX ORDER — DEXTROAMPHETAMINE SACCHARATE, AMPHETAMINE ASPARTATE MONOHYDRATE, DEXTROAMPHETAMINE SULFATE AND AMPHETAMINE SULFATE 5; 5; 5; 5 MG/1; MG/1; MG/1; MG/1
20 CAPSULE, EXTENDED RELEASE ORAL EVERY MORNING
Qty: 30 CAPSULE | Refills: 0 | Status: SHIPPED | OUTPATIENT
Start: 2020-09-18 | End: 2020-10-21 | Stop reason: SDUPTHER

## 2020-10-21 ENCOUNTER — OFFICE VISIT (OUTPATIENT)
Dept: FAMILY MEDICINE CLINIC | Facility: CLINIC | Age: 45
End: 2020-10-21

## 2020-10-21 DIAGNOSIS — F90.2 ATTENTION DEFICIT HYPERACTIVITY DISORDER (ADHD), COMBINED TYPE: Primary | ICD-10-CM

## 2020-10-21 DIAGNOSIS — F90.2 ATTENTION DEFICIT HYPERACTIVITY DISORDER (ADHD), COMBINED TYPE: ICD-10-CM

## 2020-10-21 PROCEDURE — 99442 PR PHYS/QHP TELEPHONE EVALUATION 11-20 MIN: CPT | Performed by: NURSE PRACTITIONER

## 2020-10-21 RX ORDER — DEXTROAMPHETAMINE SACCHARATE, AMPHETAMINE ASPARTATE MONOHYDRATE, DEXTROAMPHETAMINE SULFATE AND AMPHETAMINE SULFATE 5; 5; 5; 5 MG/1; MG/1; MG/1; MG/1
20 CAPSULE, EXTENDED RELEASE ORAL EVERY MORNING
Qty: 30 CAPSULE | Refills: 0 | Status: SHIPPED | OUTPATIENT
Start: 2020-10-21 | End: 2020-11-20 | Stop reason: SDUPTHER

## 2020-10-21 NOTE — PROGRESS NOTES
Subjective   Howard Cotton is a 44 y.o. male who presents to the office for ADHD follow-up.  Takes Adderall as ordered and tells me that he does not suffer from any adverse side effects of the medication.  Requests to continue with this medication as it does help him to stay focused on his responsibilities at work and home.    You have chosen to receive care through a telephone visit. Do you consent to use a telephone visit for your medical care today? Yes  I have spent 15 minutes in discussion with this patient.  History of Present Illness     The following portions of the patient's history were reviewed and updated as appropriate: allergies, current medications, past family history, past medical history, past social history, past surgical history and problem list.    Review of Systems   Constitutional: Negative for chills, fatigue and fever.   HENT: Negative for congestion, sneezing, sore throat and trouble swallowing.    Eyes: Negative for visual disturbance.   Respiratory: Negative for cough, chest tightness, shortness of breath and wheezing.    Cardiovascular: Negative for chest pain, palpitations and leg swelling.   Gastrointestinal: Negative for abdominal pain, constipation, diarrhea, nausea and vomiting.   Genitourinary: Negative for dysuria, frequency and urgency.   Musculoskeletal: Negative for neck pain.   Skin: Negative for rash.   Neurological: Negative for dizziness, weakness and headaches.   Psychiatric/Behavioral: Positive for decreased concentration.        In the past two weeks the pt has not felt down, depressed, hopeless or lost interest in doing things   All other systems reviewed and are negative.      Objective   Physical Exam  Constitutional:       General: He is not in acute distress.  Pulmonary:      Effort: Pulmonary effort is normal.   Neurological:      Mental Status: He is alert and oriented to person, place, and time.         Assessment/Plan   Diagnoses and all orders for this  visit:    1. Attention deficit hyperactivity disorder (ADHD), combined type (Primary)    Encouraged to continue with medications as ordered.       Patient understands the risks associated with this controlled medication, including tolerance and addiction.  Patient also agrees to only obtain this medication from me, and not from a another provider, unless that provider is covering for me in my absence.  Patient also agrees to be compliant in dosing, and not self adjust the dose of medication.  A signed controlled substance agreement is on file, and the patient has received a controlled substance education sheet at this a previous visit.  The patient has also signed a consent for treatment with a controlled substance as per [facility] policy. KEITH was obtained, reviewed and scanned under today's visit.          This document has been electronically signed by CEFERINO Loo on October 21, 2020 15:48 CDT

## 2020-11-20 DIAGNOSIS — F90.2 ATTENTION DEFICIT HYPERACTIVITY DISORDER (ADHD), COMBINED TYPE: ICD-10-CM

## 2020-11-20 RX ORDER — DEXTROAMPHETAMINE SACCHARATE, AMPHETAMINE ASPARTATE MONOHYDRATE, DEXTROAMPHETAMINE SULFATE AND AMPHETAMINE SULFATE 5; 5; 5; 5 MG/1; MG/1; MG/1; MG/1
20 CAPSULE, EXTENDED RELEASE ORAL EVERY MORNING
Qty: 30 CAPSULE | Refills: 0 | Status: SHIPPED | OUTPATIENT
Start: 2020-11-20 | End: 2020-12-23 | Stop reason: SDUPTHER

## 2020-12-23 DIAGNOSIS — F90.2 ATTENTION DEFICIT HYPERACTIVITY DISORDER (ADHD), COMBINED TYPE: ICD-10-CM

## 2020-12-23 RX ORDER — DEXTROAMPHETAMINE SACCHARATE, AMPHETAMINE ASPARTATE MONOHYDRATE, DEXTROAMPHETAMINE SULFATE AND AMPHETAMINE SULFATE 5; 5; 5; 5 MG/1; MG/1; MG/1; MG/1
20 CAPSULE, EXTENDED RELEASE ORAL EVERY MORNING
Qty: 30 CAPSULE | Refills: 0 | Status: SHIPPED | OUTPATIENT
Start: 2020-12-23 | End: 2021-01-27 | Stop reason: SDUPTHER

## 2021-01-27 DIAGNOSIS — F90.2 ATTENTION DEFICIT HYPERACTIVITY DISORDER (ADHD), COMBINED TYPE: ICD-10-CM

## 2021-01-27 RX ORDER — FINASTERIDE 5 MG/1
5 TABLET, FILM COATED ORAL DAILY
Qty: 90 TABLET | Refills: 1 | Status: SHIPPED | OUTPATIENT
Start: 2021-01-27 | End: 2021-07-06 | Stop reason: SDUPTHER

## 2021-01-28 RX ORDER — DEXTROAMPHETAMINE SACCHARATE, AMPHETAMINE ASPARTATE MONOHYDRATE, DEXTROAMPHETAMINE SULFATE AND AMPHETAMINE SULFATE 5; 5; 5; 5 MG/1; MG/1; MG/1; MG/1
20 CAPSULE, EXTENDED RELEASE ORAL EVERY MORNING
Qty: 30 CAPSULE | Refills: 0 | Status: SHIPPED | OUTPATIENT
Start: 2021-01-28 | End: 2021-03-03 | Stop reason: SDUPTHER

## 2021-02-26 DIAGNOSIS — F90.2 ATTENTION DEFICIT HYPERACTIVITY DISORDER (ADHD), COMBINED TYPE: ICD-10-CM

## 2021-02-26 RX ORDER — DEXTROAMPHETAMINE SACCHARATE, AMPHETAMINE ASPARTATE MONOHYDRATE, DEXTROAMPHETAMINE SULFATE AND AMPHETAMINE SULFATE 5; 5; 5; 5 MG/1; MG/1; MG/1; MG/1
20 CAPSULE, EXTENDED RELEASE ORAL EVERY MORNING
Qty: 30 CAPSULE | Refills: 0 | Status: CANCELLED | OUTPATIENT
Start: 2021-02-26

## 2021-03-01 DIAGNOSIS — F90.2 ATTENTION DEFICIT HYPERACTIVITY DISORDER (ADHD), COMBINED TYPE: ICD-10-CM

## 2021-03-01 RX ORDER — DEXTROAMPHETAMINE SACCHARATE, AMPHETAMINE ASPARTATE MONOHYDRATE, DEXTROAMPHETAMINE SULFATE AND AMPHETAMINE SULFATE 5; 5; 5; 5 MG/1; MG/1; MG/1; MG/1
20 CAPSULE, EXTENDED RELEASE ORAL EVERY MORNING
Qty: 30 CAPSULE | Refills: 0 | Status: CANCELLED | OUTPATIENT
Start: 2021-03-01

## 2021-03-03 ENCOUNTER — OFFICE VISIT (OUTPATIENT)
Dept: FAMILY MEDICINE CLINIC | Facility: CLINIC | Age: 46
End: 2021-03-03

## 2021-03-03 DIAGNOSIS — F90.2 ATTENTION DEFICIT HYPERACTIVITY DISORDER (ADHD), COMBINED TYPE: Primary | ICD-10-CM

## 2021-03-03 DIAGNOSIS — F90.2 ATTENTION DEFICIT HYPERACTIVITY DISORDER (ADHD), COMBINED TYPE: ICD-10-CM

## 2021-03-03 PROCEDURE — 99443 PR PHYS/QHP TELEPHONE EVALUATION 21-30 MIN: CPT | Performed by: NURSE PRACTITIONER

## 2021-03-03 RX ORDER — DEXTROAMPHETAMINE SACCHARATE, AMPHETAMINE ASPARTATE, DEXTROAMPHETAMINE SULFATE AND AMPHETAMINE SULFATE 1.25; 1.25; 1.25; 1.25 MG/1; MG/1; MG/1; MG/1
5 TABLET ORAL DAILY
Qty: 30 TABLET | Refills: 0 | Status: SHIPPED | OUTPATIENT
Start: 2021-03-03 | End: 2021-03-30 | Stop reason: SDUPTHER

## 2021-03-03 RX ORDER — DEXTROAMPHETAMINE SACCHARATE, AMPHETAMINE ASPARTATE MONOHYDRATE, DEXTROAMPHETAMINE SULFATE AND AMPHETAMINE SULFATE 5; 5; 5; 5 MG/1; MG/1; MG/1; MG/1
20 CAPSULE, EXTENDED RELEASE ORAL EVERY MORNING
Qty: 30 CAPSULE | Refills: 0 | Status: SHIPPED | OUTPATIENT
Start: 2021-03-03 | End: 2021-03-30 | Stop reason: SDUPTHER

## 2021-03-21 NOTE — PROGRESS NOTES
Subjective   Howard Cotton is a 45 y.o. male who presents to the office for ADHD follow-up.  Takes Adderall for this issue which in the past has worked very well.  Takes his medication about 7:30 am.  Tells me that over the past two weeks he has noticed that he crashes about 4:30 to 5:00 and is unable to stay on task for his responsibilities at home and work.  You have chosen to receive care through a telephone visit. Do you consent to use a telephone visit for your medical care today? Yes  I have spent 25 minutes in conversation with this patient.  History of Present Illness     The following portions of the patient's history were reviewed and updated as appropriate: allergies, current medications, past family history, past medical history, past social history, past surgical history and problem list.    Review of Systems   Constitutional: Negative for chills, fatigue and fever.   HENT: Negative for congestion, sneezing, sore throat and trouble swallowing.    Eyes: Negative for visual disturbance.   Respiratory: Negative for cough, chest tightness, shortness of breath and wheezing.    Cardiovascular: Negative for chest pain, palpitations and leg swelling.   Gastrointestinal: Negative for abdominal pain, constipation, diarrhea, nausea and vomiting.   Genitourinary: Negative for dysuria, frequency and urgency.   Musculoskeletal: Negative for neck pain.   Skin: Negative for rash.   Neurological: Negative for dizziness, weakness and headaches.   Psychiatric/Behavioral: Positive for decreased concentration.        In the past two weeks the pt has not felt down, depressed, hopeless or lost interest in doing things   All other systems reviewed and are negative.      Objective   Physical Exam  Pulmonary:      Effort: Pulmonary effort is normal.   Neurological:      Mental Status: He is alert and oriented to person, place, and time.         Assessment/Plan   Diagnoses and all orders for this visit:    1. Attention  deficit hyperactivity disorder (ADHD), combined type (Primary)    Discussed findings with Dr. Carlton who agreed to provide a low-dose for evening coverage.  Patient will certainly notify my office should any adverse side effects occur.     Patient understands the risks associated with this controlled medication, including tolerance and addiction.  Patient also agrees to only obtain this medication from me, and not from a another provider, unless that provider is covering for me in my absence.  Patient also agrees to be compliant in dosing, and not self adjust the dose of medication.  A signed controlled substance agreement is on file, and the patient has received a controlled substance education sheet at this a previous visit.  The patient has also signed a consent for treatment with a controlled substance as per [facility] policy. KEITH was obtained, reviewed # [id number].          This document has been electronically signed by CEFERINO Loo on March 21, 2021 09:40 CDT

## 2021-03-30 DIAGNOSIS — F90.2 ATTENTION DEFICIT HYPERACTIVITY DISORDER (ADHD), COMBINED TYPE: ICD-10-CM

## 2021-03-30 RX ORDER — DEXTROAMPHETAMINE SACCHARATE, AMPHETAMINE ASPARTATE MONOHYDRATE, DEXTROAMPHETAMINE SULFATE AND AMPHETAMINE SULFATE 5; 5; 5; 5 MG/1; MG/1; MG/1; MG/1
20 CAPSULE, EXTENDED RELEASE ORAL EVERY MORNING
Qty: 30 CAPSULE | Refills: 0 | Status: SHIPPED | OUTPATIENT
Start: 2021-03-30 | End: 2021-04-27 | Stop reason: SDUPTHER

## 2021-03-30 RX ORDER — DEXTROAMPHETAMINE SACCHARATE, AMPHETAMINE ASPARTATE, DEXTROAMPHETAMINE SULFATE AND AMPHETAMINE SULFATE 1.25; 1.25; 1.25; 1.25 MG/1; MG/1; MG/1; MG/1
5 TABLET ORAL DAILY
Qty: 30 TABLET | Refills: 0 | Status: SHIPPED | OUTPATIENT
Start: 2021-03-30 | End: 2021-04-27 | Stop reason: SDUPTHER

## 2021-04-27 DIAGNOSIS — F90.2 ATTENTION DEFICIT HYPERACTIVITY DISORDER (ADHD), COMBINED TYPE: ICD-10-CM

## 2021-04-27 RX ORDER — DEXTROAMPHETAMINE SACCHARATE, AMPHETAMINE ASPARTATE MONOHYDRATE, DEXTROAMPHETAMINE SULFATE AND AMPHETAMINE SULFATE 5; 5; 5; 5 MG/1; MG/1; MG/1; MG/1
20 CAPSULE, EXTENDED RELEASE ORAL EVERY MORNING
Qty: 30 CAPSULE | Refills: 0 | Status: SHIPPED | OUTPATIENT
Start: 2021-04-27 | End: 2021-05-28 | Stop reason: SDUPTHER

## 2021-04-27 RX ORDER — DEXTROAMPHETAMINE SACCHARATE, AMPHETAMINE ASPARTATE, DEXTROAMPHETAMINE SULFATE AND AMPHETAMINE SULFATE 1.25; 1.25; 1.25; 1.25 MG/1; MG/1; MG/1; MG/1
5 TABLET ORAL DAILY
Qty: 30 TABLET | Refills: 0 | Status: SHIPPED | OUTPATIENT
Start: 2021-04-27 | End: 2021-05-28 | Stop reason: SDUPTHER

## 2021-05-28 DIAGNOSIS — F90.2 ATTENTION DEFICIT HYPERACTIVITY DISORDER (ADHD), COMBINED TYPE: ICD-10-CM

## 2021-05-28 RX ORDER — DEXTROAMPHETAMINE SACCHARATE, AMPHETAMINE ASPARTATE, DEXTROAMPHETAMINE SULFATE AND AMPHETAMINE SULFATE 1.25; 1.25; 1.25; 1.25 MG/1; MG/1; MG/1; MG/1
5 TABLET ORAL DAILY
Qty: 30 TABLET | Refills: 0 | Status: SHIPPED | OUTPATIENT
Start: 2021-05-28 | End: 2021-06-30 | Stop reason: SDUPTHER

## 2021-05-28 RX ORDER — DEXTROAMPHETAMINE SACCHARATE, AMPHETAMINE ASPARTATE MONOHYDRATE, DEXTROAMPHETAMINE SULFATE AND AMPHETAMINE SULFATE 5; 5; 5; 5 MG/1; MG/1; MG/1; MG/1
20 CAPSULE, EXTENDED RELEASE ORAL EVERY MORNING
Qty: 30 CAPSULE | Refills: 0 | Status: SHIPPED | OUTPATIENT
Start: 2021-05-28 | End: 2021-06-30 | Stop reason: SDUPTHER

## 2021-06-30 DIAGNOSIS — F90.2 ATTENTION DEFICIT HYPERACTIVITY DISORDER (ADHD), COMBINED TYPE: ICD-10-CM

## 2021-07-02 RX ORDER — DEXTROAMPHETAMINE SACCHARATE, AMPHETAMINE ASPARTATE MONOHYDRATE, DEXTROAMPHETAMINE SULFATE AND AMPHETAMINE SULFATE 5; 5; 5; 5 MG/1; MG/1; MG/1; MG/1
20 CAPSULE, EXTENDED RELEASE ORAL EVERY MORNING
Qty: 3 CAPSULE | Refills: 0 | Status: SHIPPED | OUTPATIENT
Start: 2021-07-02 | End: 2021-07-12 | Stop reason: SDUPTHER

## 2021-07-02 RX ORDER — DEXTROAMPHETAMINE SACCHARATE, AMPHETAMINE ASPARTATE, DEXTROAMPHETAMINE SULFATE AND AMPHETAMINE SULFATE 1.25; 1.25; 1.25; 1.25 MG/1; MG/1; MG/1; MG/1
5 TABLET ORAL DAILY
Qty: 3 TABLET | Refills: 0 | Status: SHIPPED | OUTPATIENT
Start: 2021-07-02 | End: 2021-07-12 | Stop reason: SDUPTHER

## 2021-07-06 RX ORDER — FINASTERIDE 5 MG/1
5 TABLET, FILM COATED ORAL DAILY
Qty: 90 TABLET | Refills: 1 | Status: SHIPPED | OUTPATIENT
Start: 2021-07-06 | End: 2021-09-17 | Stop reason: SDUPTHER

## 2021-07-09 ENCOUNTER — OFFICE VISIT (OUTPATIENT)
Dept: FAMILY MEDICINE CLINIC | Facility: CLINIC | Age: 46
End: 2021-07-09

## 2021-07-09 VITALS
BODY MASS INDEX: 25.48 KG/M2 | HEART RATE: 98 BPM | SYSTOLIC BLOOD PRESSURE: 130 MMHG | HEIGHT: 69 IN | OXYGEN SATURATION: 99 % | DIASTOLIC BLOOD PRESSURE: 82 MMHG | WEIGHT: 172 LBS

## 2021-07-09 DIAGNOSIS — Z00.00 GENERAL MEDICAL EXAM: ICD-10-CM

## 2021-07-09 DIAGNOSIS — F90.2 ATTENTION DEFICIT HYPERACTIVITY DISORDER (ADHD), COMBINED TYPE: Primary | ICD-10-CM

## 2021-07-09 PROCEDURE — 99213 OFFICE O/P EST LOW 20 MIN: CPT | Performed by: NURSE PRACTITIONER

## 2021-07-09 RX ORDER — DEXTROAMPHETAMINE SACCHARATE, AMPHETAMINE ASPARTATE MONOHYDRATE, DEXTROAMPHETAMINE SULFATE AND AMPHETAMINE SULFATE 5; 5; 5; 5 MG/1; MG/1; MG/1; MG/1
20 CAPSULE, EXTENDED RELEASE ORAL EVERY MORNING
Qty: 3 CAPSULE | Refills: 0 | Status: CANCELLED | OUTPATIENT
Start: 2021-07-09

## 2021-07-09 RX ORDER — DEXTROAMPHETAMINE SACCHARATE, AMPHETAMINE ASPARTATE, DEXTROAMPHETAMINE SULFATE AND AMPHETAMINE SULFATE 1.25; 1.25; 1.25; 1.25 MG/1; MG/1; MG/1; MG/1
5 TABLET ORAL DAILY
Qty: 3 TABLET | Refills: 0 | Status: CANCELLED | OUTPATIENT
Start: 2021-07-09

## 2021-07-11 NOTE — PROGRESS NOTES
Subjective   Howard Cotton is a 45 y.o. male who presents to the office for ADD/ADHD follow-up.  Is taking Adderall as ordered and without adverse side effects.  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    History of Present Illness     The following portions of the patient's history were reviewed and updated as appropriate: allergies, current medications, past family history, past medical history, past social history, past surgical history and problem list.    Review of Systems   Constitutional: Negative for chills, fatigue and fever.   HENT: Negative for congestion, sneezing, sore throat and trouble swallowing.    Eyes: Negative for visual disturbance.   Respiratory: Negative for cough, chest tightness, shortness of breath and wheezing.    Cardiovascular: Negative for chest pain, palpitations and leg swelling.   Gastrointestinal: Negative for abdominal pain, constipation, diarrhea, nausea and vomiting.   Genitourinary: Negative for dysuria, frequency and urgency.   Musculoskeletal: Negative for neck pain.   Skin: Negative for rash.   Neurological: Negative for dizziness, weakness and headaches.   Psychiatric/Behavioral: Positive for decreased concentration.        In the past two weeks the pt has not felt down, depressed, hopeless or lost interest in doing things   All other systems reviewed and are negative.      Objective   Physical Exam  Vitals and nursing note reviewed.   Constitutional:       General: He is not in acute distress.     Appearance: Normal appearance. He is well-developed. He is not ill-appearing.   HENT:      Head: Normocephalic and atraumatic.      Right Ear: External ear normal.      Left Ear: External ear normal.      Nose: Nose normal.   Eyes:      General: No scleral icterus.        Right eye: No discharge.         Left eye: No discharge.      Conjunctiva/sclera: Conjunctivae normal.      Pupils: Pupils are  equal, round, and reactive to light.   Neck:      Thyroid: No thyromegaly.   Cardiovascular:      Rate and Rhythm: Normal rate and regular rhythm.      Heart sounds: Normal heart sounds. No murmur heard.   No friction rub. No gallop.    Pulmonary:      Effort: Pulmonary effort is normal. No respiratory distress.      Breath sounds: Normal breath sounds. No wheezing or rales.   Abdominal:      General: Bowel sounds are normal. There is no distension.      Palpations: Abdomen is soft.      Tenderness: There is no abdominal tenderness. There is no guarding or rebound.   Musculoskeletal:         General: Normal range of motion.      Cervical back: Normal range of motion and neck supple.   Lymphadenopathy:      Cervical: No cervical adenopathy.   Skin:     General: Skin is warm and dry.      Capillary Refill: Capillary refill takes less than 2 seconds.      Findings: No rash.   Neurological:      Mental Status: He is alert and oriented to person, place, and time.      GCS: GCS eye subscore is 4. GCS verbal subscore is 5. GCS motor subscore is 6.      Cranial Nerves: Cranial nerves are intact. No cranial nerve deficit.   Psychiatric:         Behavior: Behavior normal. Behavior is cooperative.         Thought Content: Thought content normal.         Judgment: Judgment normal.         Assessment/Plan   Diagnoses and all orders for this visit:    1. Attention deficit hyperactivity disorder (ADHD), combined type (Primary)  -     Urine Drug Screen - Urine, Clean Catch; Future    2. General medical exam  -     CBC & Differential; Future  -     Comprehensive Metabolic Panel; Future  -     T4, Free; Future  -     TSH; Future  -     Urinalysis With Culture If Indicated -; Future  -     Lipid Panel; Future    Other orders  -     Cancel: amphetamine-dextroamphetamine XR (Adderall XR) 20 MG 24 hr capsule; Take 1 capsule by mouth Every Morning  Dispense: 3 capsule; Refill: 0  -     Cancel: amphetamine-dextroamphetamine (Adderall) 5 MG  tablet; Take 1 tablet by mouth Daily. In the afternoon  Dispense: 3 tablet; Refill: 0    Will continue with medication as ordered.     Patient understands the risks associated with this controlled medication, including tolerance and addiction.  Patient also agrees to only obtain this medication from me, and not from a another provider, unless that provider is covering for me in my absence.  Patient also agrees to be compliant in dosing, and not self adjust the dose of medication.  A signed controlled substance agreement is on file, and the patient has received a controlled substance education sheet at this a previous visit.  The patient has also signed a consent for treatment with a controlled substance as per [facility] policy. KEITH was obtained, reviewed # [id number].          This document has been electronically signed by CEFERINO Loo on July 11, 2021 06:53 CDT

## 2021-07-12 DIAGNOSIS — F90.2 ATTENTION DEFICIT HYPERACTIVITY DISORDER (ADHD), COMBINED TYPE: ICD-10-CM

## 2021-07-12 RX ORDER — DEXTROAMPHETAMINE SACCHARATE, AMPHETAMINE ASPARTATE, DEXTROAMPHETAMINE SULFATE AND AMPHETAMINE SULFATE 1.25; 1.25; 1.25; 1.25 MG/1; MG/1; MG/1; MG/1
5 TABLET ORAL DAILY
Qty: 3 TABLET | Refills: 0 | Status: SHIPPED | OUTPATIENT
Start: 2021-07-12 | End: 2021-07-13 | Stop reason: SDUPTHER

## 2021-07-12 RX ORDER — DEXTROAMPHETAMINE SACCHARATE, AMPHETAMINE ASPARTATE MONOHYDRATE, DEXTROAMPHETAMINE SULFATE AND AMPHETAMINE SULFATE 5; 5; 5; 5 MG/1; MG/1; MG/1; MG/1
20 CAPSULE, EXTENDED RELEASE ORAL EVERY MORNING
Qty: 3 CAPSULE | Refills: 0 | Status: SHIPPED | OUTPATIENT
Start: 2021-07-12 | End: 2021-07-13 | Stop reason: SDUPTHER

## 2021-07-13 DIAGNOSIS — F90.2 ATTENTION DEFICIT HYPERACTIVITY DISORDER (ADHD), COMBINED TYPE: ICD-10-CM

## 2021-07-13 RX ORDER — DEXTROAMPHETAMINE SACCHARATE, AMPHETAMINE ASPARTATE, DEXTROAMPHETAMINE SULFATE AND AMPHETAMINE SULFATE 1.25; 1.25; 1.25; 1.25 MG/1; MG/1; MG/1; MG/1
5 TABLET ORAL DAILY
Qty: 30 TABLET | Refills: 0 | Status: SHIPPED | OUTPATIENT
Start: 2021-07-13 | End: 2021-08-17 | Stop reason: SDUPTHER

## 2021-07-13 RX ORDER — DEXTROAMPHETAMINE SACCHARATE, AMPHETAMINE ASPARTATE MONOHYDRATE, DEXTROAMPHETAMINE SULFATE AND AMPHETAMINE SULFATE 5; 5; 5; 5 MG/1; MG/1; MG/1; MG/1
20 CAPSULE, EXTENDED RELEASE ORAL EVERY MORNING
Qty: 30 CAPSULE | Refills: 0 | Status: SHIPPED | OUTPATIENT
Start: 2021-07-13 | End: 2021-08-17 | Stop reason: SDUPTHER

## 2021-08-17 DIAGNOSIS — F90.2 ATTENTION DEFICIT HYPERACTIVITY DISORDER (ADHD), COMBINED TYPE: ICD-10-CM

## 2021-08-17 RX ORDER — DEXTROAMPHETAMINE SACCHARATE, AMPHETAMINE ASPARTATE MONOHYDRATE, DEXTROAMPHETAMINE SULFATE AND AMPHETAMINE SULFATE 5; 5; 5; 5 MG/1; MG/1; MG/1; MG/1
20 CAPSULE, EXTENDED RELEASE ORAL EVERY MORNING
Qty: 30 CAPSULE | Refills: 0 | Status: SHIPPED | OUTPATIENT
Start: 2021-08-17 | End: 2021-09-17 | Stop reason: SDUPTHER

## 2021-08-17 RX ORDER — DEXTROAMPHETAMINE SACCHARATE, AMPHETAMINE ASPARTATE, DEXTROAMPHETAMINE SULFATE AND AMPHETAMINE SULFATE 1.25; 1.25; 1.25; 1.25 MG/1; MG/1; MG/1; MG/1
5 TABLET ORAL DAILY
Qty: 30 TABLET | Refills: 0 | Status: SHIPPED | OUTPATIENT
Start: 2021-08-17 | End: 2021-09-17 | Stop reason: SDUPTHER

## 2021-09-17 DIAGNOSIS — F90.2 ATTENTION DEFICIT HYPERACTIVITY DISORDER (ADHD), COMBINED TYPE: ICD-10-CM

## 2021-09-17 RX ORDER — DEXTROAMPHETAMINE SACCHARATE, AMPHETAMINE ASPARTATE MONOHYDRATE, DEXTROAMPHETAMINE SULFATE AND AMPHETAMINE SULFATE 5; 5; 5; 5 MG/1; MG/1; MG/1; MG/1
20 CAPSULE, EXTENDED RELEASE ORAL EVERY MORNING
Qty: 30 CAPSULE | Refills: 0 | Status: SHIPPED | OUTPATIENT
Start: 2021-09-17 | End: 2021-10-21 | Stop reason: SDUPTHER

## 2021-09-17 RX ORDER — FINASTERIDE 5 MG/1
5 TABLET, FILM COATED ORAL DAILY
Qty: 90 TABLET | Refills: 1 | Status: SHIPPED | OUTPATIENT
Start: 2021-09-17 | End: 2022-02-28 | Stop reason: SDUPTHER

## 2021-09-17 RX ORDER — DEXTROAMPHETAMINE SACCHARATE, AMPHETAMINE ASPARTATE, DEXTROAMPHETAMINE SULFATE AND AMPHETAMINE SULFATE 1.25; 1.25; 1.25; 1.25 MG/1; MG/1; MG/1; MG/1
5 TABLET ORAL DAILY
Qty: 30 TABLET | Refills: 0 | Status: SHIPPED | OUTPATIENT
Start: 2021-09-17 | End: 2021-10-21 | Stop reason: SDUPTHER

## 2021-09-17 NOTE — TELEPHONE ENCOUNTER
Rx Refill Note  Requested Prescriptions     Pending Prescriptions Disp Refills   • finasteride (PROSCAR) 5 MG tablet 90 tablet 1     Sig: Take 1 tablet by mouth Daily.      Last office visit with prescribing clinician: 7/9/2021      Next office visit with prescribing clinician: Visit date not found            Kathy Lindsay LPN  09/17/21, 10:46 CDT

## 2021-10-19 ENCOUNTER — OFFICE VISIT (OUTPATIENT)
Dept: FAMILY MEDICINE CLINIC | Facility: CLINIC | Age: 46
End: 2021-10-19

## 2021-10-19 VITALS
WEIGHT: 174 LBS | BODY MASS INDEX: 25.77 KG/M2 | HEIGHT: 69 IN | OXYGEN SATURATION: 98 % | TEMPERATURE: 98.7 F | DIASTOLIC BLOOD PRESSURE: 74 MMHG | HEART RATE: 79 BPM | SYSTOLIC BLOOD PRESSURE: 122 MMHG

## 2021-10-19 DIAGNOSIS — Z79.899 LONG TERM CURRENT USE OF THERAPEUTIC DRUG: ICD-10-CM

## 2021-10-19 DIAGNOSIS — Z00.00 GENERAL MEDICAL EXAM: ICD-10-CM

## 2021-10-19 DIAGNOSIS — Z12.5 PROSTATE CANCER SCREENING: ICD-10-CM

## 2021-10-19 DIAGNOSIS — G47.19 EXCESSIVE DAYTIME SLEEPINESS: Primary | ICD-10-CM

## 2021-10-19 PROCEDURE — 99213 OFFICE O/P EST LOW 20 MIN: CPT | Performed by: NURSE PRACTITIONER

## 2021-10-20 ENCOUNTER — LAB (OUTPATIENT)
Dept: LAB | Facility: OTHER | Age: 46
End: 2021-10-20

## 2021-10-20 DIAGNOSIS — Z79.899 LONG TERM CURRENT USE OF THERAPEUTIC DRUG: ICD-10-CM

## 2021-10-20 DIAGNOSIS — Z00.00 GENERAL MEDICAL EXAM: ICD-10-CM

## 2021-10-20 DIAGNOSIS — F90.2 ATTENTION DEFICIT HYPERACTIVITY DISORDER (ADHD), COMBINED TYPE: ICD-10-CM

## 2021-10-20 DIAGNOSIS — Z12.5 PROSTATE CANCER SCREENING: ICD-10-CM

## 2021-10-20 DIAGNOSIS — G47.19 EXCESSIVE DAYTIME SLEEPINESS: ICD-10-CM

## 2021-10-20 LAB
25(OH)D3 SERPL-MCNC: 70.1 NG/ML
ALBUMIN SERPL-MCNC: 4.7 G/DL (ref 3.5–5)
ALBUMIN/GLOB SERPL: 1.8 G/DL (ref 1.1–1.8)
ALP SERPL-CCNC: 44 U/L (ref 38–126)
ALT SERPL W P-5'-P-CCNC: 18 U/L
AMPHET+METHAMPHET UR QL: POSITIVE
AMPHETAMINES UR QL: NEGATIVE
ANION GAP SERPL CALCULATED.3IONS-SCNC: 8 MMOL/L (ref 5–15)
AST SERPL-CCNC: 26 U/L (ref 17–59)
BARBITURATES UR QL SCN: NEGATIVE
BASOPHILS # BLD AUTO: 0.01 10*3/MM3 (ref 0–0.2)
BASOPHILS NFR BLD AUTO: 0.2 % (ref 0–1.5)
BENZODIAZ UR QL SCN: NEGATIVE
BILIRUB SERPL-MCNC: 0.4 MG/DL (ref 0.2–1.3)
BILIRUB UR QL STRIP: NEGATIVE
BUN SERPL-MCNC: 21 MG/DL (ref 7–23)
BUN/CREAT SERPL: 15.4 (ref 7–25)
BUPRENORPHINE SERPL-MCNC: NEGATIVE NG/ML
CALCIUM SPEC-SCNC: 9.4 MG/DL (ref 8.4–10.2)
CANNABINOIDS SERPL QL: NEGATIVE
CHLORIDE SERPL-SCNC: 105 MMOL/L (ref 101–112)
CHOLEST SERPL-MCNC: 175 MG/DL (ref 150–200)
CLARITY UR: CLEAR
CO2 SERPL-SCNC: 29 MMOL/L (ref 22–30)
COCAINE UR QL: NEGATIVE
COLOR UR: YELLOW
CREAT SERPL-MCNC: 1.36 MG/DL (ref 0.7–1.3)
DEPRECATED RDW RBC AUTO: 41.9 FL (ref 37–54)
EOSINOPHIL # BLD AUTO: 0.08 10*3/MM3 (ref 0–0.4)
EOSINOPHIL NFR BLD AUTO: 1.8 % (ref 0.3–6.2)
ERYTHROCYTE [DISTWIDTH] IN BLOOD BY AUTOMATED COUNT: 12.3 % (ref 12.3–15.4)
FERRITIN SERPL-MCNC: 67.1 NG/ML (ref 30–400)
FOLATE SERPL-MCNC: 8.88 NG/ML (ref 4.78–24.2)
GFR SERPL CREATININE-BSD FRML MDRD: 56 ML/MIN/1.73 (ref 63–147)
GLOBULIN UR ELPH-MCNC: 2.6 GM/DL (ref 2.3–3.5)
GLUCOSE SERPL-MCNC: 116 MG/DL (ref 70–99)
GLUCOSE UR STRIP-MCNC: NEGATIVE MG/DL
HBA1C MFR BLD: 5.17 % (ref 4.8–5.6)
HCT VFR BLD AUTO: 40.9 % (ref 37.5–51)
HDLC SERPL-MCNC: 54 MG/DL (ref 40–59)
HGB BLD-MCNC: 14.3 G/DL (ref 13–17.7)
HGB UR QL STRIP.AUTO: NEGATIVE
IRON 24H UR-MRATE: 55 MCG/DL (ref 59–158)
KETONES UR QL STRIP: NEGATIVE
LDLC SERPL CALC-MCNC: 94 MG/DL
LDLC/HDLC SERPL: 1.66 {RATIO} (ref 0–3.55)
LEUKOCYTE ESTERASE UR QL STRIP.AUTO: NEGATIVE
LYMPHOCYTES # BLD AUTO: 1.74 10*3/MM3 (ref 0.7–3.1)
LYMPHOCYTES NFR BLD AUTO: 38.9 % (ref 19.6–45.3)
MCH RBC QN AUTO: 33.3 PG (ref 26.6–33)
MCHC RBC AUTO-ENTMCNC: 35 G/DL (ref 31.5–35.7)
MCV RBC AUTO: 95.1 FL (ref 79–97)
METHADONE UR QL SCN: NEGATIVE
MONOCYTES # BLD AUTO: 0.34 10*3/MM3 (ref 0.1–0.9)
MONOCYTES NFR BLD AUTO: 7.6 % (ref 5–12)
NEUTROPHILS NFR BLD AUTO: 2.3 10*3/MM3 (ref 1.7–7)
NEUTROPHILS NFR BLD AUTO: 51.5 % (ref 42.7–76)
NITRITE UR QL STRIP: NEGATIVE
OPIATES UR QL: NEGATIVE
OXYCODONE UR QL SCN: NEGATIVE
PCP UR QL SCN: NEGATIVE
PH UR STRIP.AUTO: 5.5 [PH] (ref 5.5–8)
PLATELET # BLD AUTO: 210 10*3/MM3 (ref 140–450)
PMV BLD AUTO: 10.5 FL (ref 6–12)
POTASSIUM SERPL-SCNC: 4.1 MMOL/L (ref 3.4–5)
PROPOXYPH UR QL: NEGATIVE
PROT SERPL-MCNC: 7.3 G/DL (ref 6.3–8.6)
PROT UR QL STRIP: NEGATIVE
PSA SERPL-MCNC: 0.43 NG/ML (ref 0–4)
RBC # BLD AUTO: 4.3 10*6/MM3 (ref 4.14–5.8)
SODIUM SERPL-SCNC: 142 MMOL/L (ref 137–145)
SP GR UR STRIP: >=1.03 (ref 1–1.03)
T4 FREE SERPL-MCNC: 1.29 NG/DL (ref 0.93–1.7)
TRICYCLICS UR QL SCN: NEGATIVE
TRIGL SERPL-MCNC: 158 MG/DL
TSH SERPL DL<=0.05 MIU/L-ACNC: 2.71 UIU/ML (ref 0.27–4.2)
UROBILINOGEN UR QL STRIP: NORMAL
VIT B12 BLD-MCNC: 1260 PG/ML (ref 211–946)
VLDLC SERPL-MCNC: 27 MG/DL (ref 5–40)
WBC # BLD AUTO: 4.47 10*3/MM3 (ref 3.4–10.8)

## 2021-10-20 PROCEDURE — 80061 LIPID PANEL: CPT | Performed by: NURSE PRACTITIONER

## 2021-10-20 PROCEDURE — 36415 COLL VENOUS BLD VENIPUNCTURE: CPT | Performed by: NURSE PRACTITIONER

## 2021-10-20 PROCEDURE — 82306 VITAMIN D 25 HYDROXY: CPT | Performed by: NURSE PRACTITIONER

## 2021-10-20 PROCEDURE — 83540 ASSAY OF IRON: CPT | Performed by: NURSE PRACTITIONER

## 2021-10-20 PROCEDURE — 82746 ASSAY OF FOLIC ACID SERUM: CPT | Performed by: NURSE PRACTITIONER

## 2021-10-20 PROCEDURE — 82728 ASSAY OF FERRITIN: CPT | Performed by: NURSE PRACTITIONER

## 2021-10-20 PROCEDURE — 83036 HEMOGLOBIN GLYCOSYLATED A1C: CPT | Performed by: NURSE PRACTITIONER

## 2021-10-20 PROCEDURE — 80053 COMPREHEN METABOLIC PANEL: CPT | Performed by: NURSE PRACTITIONER

## 2021-10-20 PROCEDURE — 84402 ASSAY OF FREE TESTOSTERONE: CPT | Performed by: NURSE PRACTITIONER

## 2021-10-20 PROCEDURE — 82607 VITAMIN B-12: CPT | Performed by: NURSE PRACTITIONER

## 2021-10-20 PROCEDURE — 84403 ASSAY OF TOTAL TESTOSTERONE: CPT | Performed by: NURSE PRACTITIONER

## 2021-10-20 PROCEDURE — 84443 ASSAY THYROID STIM HORMONE: CPT | Performed by: NURSE PRACTITIONER

## 2021-10-20 PROCEDURE — G0103 PSA SCREENING: HCPCS | Performed by: NURSE PRACTITIONER

## 2021-10-20 PROCEDURE — 84439 ASSAY OF FREE THYROXINE: CPT | Performed by: NURSE PRACTITIONER

## 2021-10-20 PROCEDURE — 80306 DRUG TEST PRSMV INSTRMNT: CPT | Performed by: NURSE PRACTITIONER

## 2021-10-20 PROCEDURE — 81003 URINALYSIS AUTO W/O SCOPE: CPT | Performed by: NURSE PRACTITIONER

## 2021-10-20 PROCEDURE — 85025 COMPLETE CBC W/AUTO DIFF WBC: CPT | Performed by: NURSE PRACTITIONER

## 2021-10-21 DIAGNOSIS — F90.2 ATTENTION DEFICIT HYPERACTIVITY DISORDER (ADHD), COMBINED TYPE: ICD-10-CM

## 2021-10-21 RX ORDER — DEXTROAMPHETAMINE SACCHARATE, AMPHETAMINE ASPARTATE, DEXTROAMPHETAMINE SULFATE AND AMPHETAMINE SULFATE 1.25; 1.25; 1.25; 1.25 MG/1; MG/1; MG/1; MG/1
5 TABLET ORAL DAILY
Qty: 30 TABLET | Refills: 0 | Status: SHIPPED | OUTPATIENT
Start: 2021-10-21 | End: 2021-11-17 | Stop reason: SDUPTHER

## 2021-10-21 RX ORDER — DEXTROAMPHETAMINE SACCHARATE, AMPHETAMINE ASPARTATE MONOHYDRATE, DEXTROAMPHETAMINE SULFATE AND AMPHETAMINE SULFATE 5; 5; 5; 5 MG/1; MG/1; MG/1; MG/1
20 CAPSULE, EXTENDED RELEASE ORAL EVERY MORNING
Qty: 30 CAPSULE | Refills: 0 | Status: SHIPPED | OUTPATIENT
Start: 2021-10-21 | End: 2021-11-17 | Stop reason: SDUPTHER

## 2021-10-23 LAB
TESTOST FREE SERPL-MCNC: 11.6 PG/ML (ref 6.8–21.5)
TESTOST SERPL-MCNC: 427 NG/DL (ref 264–916)

## 2021-11-07 NOTE — PROGRESS NOTES
Subjective   Howard Cotton is a 45 y.o. male who presents to the office for ADD follow-up.  Tells me that he does take Adderall as directed but has noticed that he continues to feel fatigued in the evening.  Has started snoring in the past year.  Has had a scope in the past year.  Does not sleep late anymore, wakes up at six every morning.  History of Present Illness     The following portions of the patient's history were reviewed and updated as appropriate: allergies, current medications, past family history, past medical history, past social history, past surgical history and problem list.    Review of Systems   Constitutional: Positive for fatigue. Negative for chills and fever.   HENT: Negative for congestion, sneezing, sore throat and trouble swallowing.    Eyes: Negative for visual disturbance.   Respiratory: Negative for cough, chest tightness, shortness of breath and wheezing.    Cardiovascular: Negative for chest pain, palpitations and leg swelling.   Gastrointestinal: Negative for abdominal pain, constipation, diarrhea, nausea and vomiting.   Genitourinary: Negative for dysuria, frequency and urgency.   Musculoskeletal: Negative for neck pain.   Skin: Negative for rash.   Neurological: Negative for dizziness, weakness and headaches.   Psychiatric/Behavioral: Positive for decreased concentration.        In the past two weeks the pt has not felt down, depressed, hopeless or lost interest in doing things   All other systems reviewed and are negative.      Objective   Physical Exam  Vitals and nursing note reviewed.   Constitutional:       Appearance: He is well-developed.   HENT:      Head: Normocephalic and atraumatic.      Right Ear: External ear normal.      Left Ear: External ear normal.      Nose: Nose normal.   Eyes:      General: No scleral icterus.        Right eye: No discharge.         Left eye: No discharge.      Conjunctiva/sclera: Conjunctivae normal.      Pupils: Pupils are equal, round,  and reactive to light.   Neck:      Thyroid: No thyromegaly.   Cardiovascular:      Rate and Rhythm: Normal rate and regular rhythm.      Heart sounds: Normal heart sounds. No murmur heard.  No friction rub. No gallop.    Pulmonary:      Effort: Pulmonary effort is normal. No respiratory distress.      Breath sounds: Normal breath sounds. No wheezing or rales.   Abdominal:      General: Bowel sounds are normal. There is no distension.      Palpations: Abdomen is soft.      Tenderness: There is no abdominal tenderness. There is no guarding or rebound.   Musculoskeletal:         General: Normal range of motion.      Cervical back: Normal range of motion and neck supple.   Lymphadenopathy:      Cervical: No cervical adenopathy.   Skin:     General: Skin is warm and dry.      Findings: No rash.   Neurological:      Mental Status: He is alert and oriented to person, place, and time.      GCS: GCS eye subscore is 4. GCS verbal subscore is 5. GCS motor subscore is 6.      Cranial Nerves: No cranial nerve deficit.   Psychiatric:         Attention and Perception: Attention and perception normal.         Mood and Affect: Mood and affect normal.         Speech: Speech normal.         Behavior: Behavior normal. Behavior is cooperative.         Thought Content: Thought content normal. Thought content does not include homicidal or suicidal ideation. Thought content does not include homicidal or suicidal plan.         Cognition and Memory: Cognition and memory normal.         Judgment: Judgment normal.         Assessment/Plan   Diagnoses and all orders for this visit:    1. Excessive daytime sleepiness (Primary)  -     Vitamin B12; Future  -     Vitamin D 25 Hydroxy; Future  -     Iron; Future  -     Folate; Future  -     Ferritin; Future  -     Testosterone, Free, Total; Future    2. Long term current use of therapeutic drug  -     Urine Drug Screen - Urine, Clean Catch; Future    3. Prostate cancer screening  -     PSA Screen;  Future    4. General medical exam  -     CBC & Differential; Future  -     Comprehensive Metabolic Panel; Future  -     Hemoglobin A1c; Future  -     Lipid Panel; Future  -     Urinalysis With Culture If Indicated -; Future  -     TSH; Future    Will update labs and add testosterone and fatigue labs as well.  Want him to eat healthy and walk three times a week.     Patient understands the risks associated with this controlled medication, including tolerance and addiction.  Patient also agrees to only obtain this medication from me, and not from a another provider, unless that provider is covering for me in my absence.  Patient also agrees to be compliant in dosing, and not self adjust the dose of medication.  A signed controlled substance agreement is on file, and the patient has received a controlled substance education sheet at this a previous visit.  The patient has also signed a consent for treatment with a controlled substance as per [facility] policy. KEITH was obtained, reviewed # [id number].          This document has been electronically signed by CEFERINO Loo on November 7, 2021 09:57 CST

## 2021-11-17 DIAGNOSIS — F90.2 ATTENTION DEFICIT HYPERACTIVITY DISORDER (ADHD), COMBINED TYPE: ICD-10-CM

## 2021-11-17 RX ORDER — DEXTROAMPHETAMINE SACCHARATE, AMPHETAMINE ASPARTATE, DEXTROAMPHETAMINE SULFATE AND AMPHETAMINE SULFATE 1.25; 1.25; 1.25; 1.25 MG/1; MG/1; MG/1; MG/1
5 TABLET ORAL DAILY
Qty: 30 TABLET | Refills: 0 | Status: SHIPPED | OUTPATIENT
Start: 2021-11-17 | End: 2021-12-24 | Stop reason: SDUPTHER

## 2021-11-17 RX ORDER — DEXTROAMPHETAMINE SACCHARATE, AMPHETAMINE ASPARTATE MONOHYDRATE, DEXTROAMPHETAMINE SULFATE AND AMPHETAMINE SULFATE 5; 5; 5; 5 MG/1; MG/1; MG/1; MG/1
20 CAPSULE, EXTENDED RELEASE ORAL EVERY MORNING
Qty: 30 CAPSULE | Refills: 0 | Status: SHIPPED | OUTPATIENT
Start: 2021-11-17 | End: 2021-12-24 | Stop reason: SDUPTHER

## 2021-12-24 DIAGNOSIS — F90.2 ATTENTION DEFICIT HYPERACTIVITY DISORDER (ADHD), COMBINED TYPE: ICD-10-CM

## 2021-12-27 RX ORDER — DEXTROAMPHETAMINE SACCHARATE, AMPHETAMINE ASPARTATE, DEXTROAMPHETAMINE SULFATE AND AMPHETAMINE SULFATE 1.25; 1.25; 1.25; 1.25 MG/1; MG/1; MG/1; MG/1
5 TABLET ORAL DAILY
Qty: 30 TABLET | Refills: 0 | Status: SHIPPED | OUTPATIENT
Start: 2021-12-27 | End: 2022-01-25 | Stop reason: SDUPTHER

## 2021-12-27 RX ORDER — DEXTROAMPHETAMINE SACCHARATE, AMPHETAMINE ASPARTATE MONOHYDRATE, DEXTROAMPHETAMINE SULFATE AND AMPHETAMINE SULFATE 5; 5; 5; 5 MG/1; MG/1; MG/1; MG/1
20 CAPSULE, EXTENDED RELEASE ORAL EVERY MORNING
Qty: 30 CAPSULE | Refills: 0 | Status: SHIPPED | OUTPATIENT
Start: 2021-12-27 | End: 2022-01-25 | Stop reason: SDUPTHER

## 2022-01-25 DIAGNOSIS — F90.2 ATTENTION DEFICIT HYPERACTIVITY DISORDER (ADHD), COMBINED TYPE: ICD-10-CM

## 2022-01-26 ENCOUNTER — OFFICE VISIT (OUTPATIENT)
Dept: FAMILY MEDICINE CLINIC | Facility: CLINIC | Age: 47
End: 2022-01-26

## 2022-01-26 VITALS
WEIGHT: 174 LBS | OXYGEN SATURATION: 99 % | TEMPERATURE: 97.1 F | BODY MASS INDEX: 25.77 KG/M2 | HEIGHT: 69 IN | SYSTOLIC BLOOD PRESSURE: 142 MMHG | DIASTOLIC BLOOD PRESSURE: 80 MMHG | HEART RATE: 79 BPM

## 2022-01-26 DIAGNOSIS — F90.2 ATTENTION DEFICIT HYPERACTIVITY DISORDER (ADHD), COMBINED TYPE: Primary | ICD-10-CM

## 2022-01-26 DIAGNOSIS — N40.0 BENIGN PROSTATIC HYPERPLASIA WITHOUT LOWER URINARY TRACT SYMPTOMS: ICD-10-CM

## 2022-01-26 PROCEDURE — 99213 OFFICE O/P EST LOW 20 MIN: CPT | Performed by: NURSE PRACTITIONER

## 2022-01-26 RX ORDER — DEXTROAMPHETAMINE SACCHARATE, AMPHETAMINE ASPARTATE MONOHYDRATE, DEXTROAMPHETAMINE SULFATE AND AMPHETAMINE SULFATE 5; 5; 5; 5 MG/1; MG/1; MG/1; MG/1
20 CAPSULE, EXTENDED RELEASE ORAL EVERY MORNING
Qty: 30 CAPSULE | Refills: 0 | Status: SHIPPED | OUTPATIENT
Start: 2022-01-26 | End: 2022-02-28 | Stop reason: SDUPTHER

## 2022-01-26 RX ORDER — DEXTROAMPHETAMINE SACCHARATE, AMPHETAMINE ASPARTATE, DEXTROAMPHETAMINE SULFATE AND AMPHETAMINE SULFATE 1.25; 1.25; 1.25; 1.25 MG/1; MG/1; MG/1; MG/1
5 TABLET ORAL DAILY
Qty: 30 TABLET | Refills: 0 | Status: SHIPPED | OUTPATIENT
Start: 2022-01-26 | End: 2022-02-28 | Stop reason: SDUPTHER

## 2022-01-31 NOTE — PROGRESS NOTES
Subjective   Howard Cotton is a 46 y.o. male who presents to the office for ADHD and BPH follow-up.  Continues to take Adderall for his chronic issue of ADHD.  Denies adverse side effects.   By taking this medication it allows him to stay focused and on task for responsibilities at his job.  He is owner of [employer].  History of Present Illness     The following portions of the patient's history were reviewed and updated as appropriate: allergies, current medications, past family history, past medical history, past social history, past surgical history and problem list.    Review of Systems   Constitutional: Negative for chills, fatigue and fever.   HENT: Negative for congestion, sneezing, sore throat and trouble swallowing.    Eyes: Negative for visual disturbance.   Respiratory: Negative for cough, chest tightness, shortness of breath and wheezing.    Cardiovascular: Negative for chest pain, palpitations and leg swelling.   Gastrointestinal: Negative for abdominal pain, constipation, diarrhea, nausea and vomiting.   Genitourinary: Negative for dysuria, frequency and urgency.   Musculoskeletal: Negative for neck pain.   Skin: Negative for rash.   Neurological: Negative for dizziness, weakness and headaches.   Psychiatric/Behavioral: The patient is hyperactive.         In the past two weeks the pt has not felt down, depressed, hopeless or lost interest in doing things   All other systems reviewed and are negative.      Objective   Physical Exam  Vitals and nursing note reviewed.   Constitutional:       General: He is not in acute distress.     Appearance: Normal appearance. He is well-developed. He is not ill-appearing.   HENT:      Head: Normocephalic and atraumatic.      Right Ear: External ear normal.      Left Ear: External ear normal.      Nose: Nose normal.   Eyes:      General: No scleral icterus.        Right eye: No discharge.         Left eye: No discharge.      Conjunctiva/sclera:  Conjunctivae normal.      Pupils: Pupils are equal, round, and reactive to light.   Neck:      Thyroid: No thyromegaly.   Cardiovascular:      Rate and Rhythm: Normal rate and regular rhythm.      Heart sounds: Normal heart sounds. No murmur heard.  No friction rub. No gallop.    Pulmonary:      Effort: Pulmonary effort is normal. No respiratory distress.      Breath sounds: Normal breath sounds. No wheezing or rales.   Abdominal:      General: Bowel sounds are normal. There is no distension.      Palpations: Abdomen is soft.      Tenderness: There is no abdominal tenderness. There is no guarding or rebound.   Musculoskeletal:         General: Normal range of motion.      Cervical back: Normal range of motion and neck supple.   Lymphadenopathy:      Cervical: No cervical adenopathy.   Skin:     General: Skin is warm and dry.      Findings: No rash.   Neurological:      General: No focal deficit present.      Mental Status: He is alert and oriented to person, place, and time.      GCS: GCS eye subscore is 4. GCS verbal subscore is 5. GCS motor subscore is 6.      Cranial Nerves: No cranial nerve deficit.   Psychiatric:         Attention and Perception: Attention and perception normal.         Mood and Affect: Mood and affect normal.         Speech: Speech normal.         Behavior: Behavior normal. Behavior is cooperative.         Thought Content: Thought content normal. Thought content does not include homicidal or suicidal ideation. Thought content does not include homicidal or suicidal plan.         Cognition and Memory: Cognition and memory normal.         Judgment: Judgment normal.      Comments: Dressed appropriately for weather and situation, makes eye contact and engages in conversation         Assessment/Plan   Diagnoses and all orders for this visit:    1. Attention deficit hyperactivity disorder (ADHD), combined type (Primary)    2. Benign prostatic hyperplasia without lower urinary tract  symptoms    Encouraged to continue with medications as ordered.  Will notify my office for any adverse side effects.     Patient understands the risks associated with this controlled medication, including tolerance and addiction.  Patient also agrees to only obtain this medication from me, and not from a another provider, unless that provider is covering for me in my absence.  Patient also agrees to be compliant in dosing, and not self adjust the dose of medication.  A signed controlled substance agreement is on file, and the patient has received a controlled substance education sheet at this a previous visit.  The patient has also signed a consent for treatment with a controlled substance as per [facility] policy. KEITH was obtained, reviewed # [id number].          This document has been electronically signed by CEFERINO Loo on January 30, 2022 21:53 CST

## 2022-02-28 DIAGNOSIS — F90.2 ATTENTION DEFICIT HYPERACTIVITY DISORDER (ADHD), COMBINED TYPE: ICD-10-CM

## 2022-02-28 RX ORDER — DEXTROAMPHETAMINE SACCHARATE, AMPHETAMINE ASPARTATE, DEXTROAMPHETAMINE SULFATE AND AMPHETAMINE SULFATE 1.25; 1.25; 1.25; 1.25 MG/1; MG/1; MG/1; MG/1
5 TABLET ORAL DAILY
Qty: 30 TABLET | Refills: 0 | Status: SHIPPED | OUTPATIENT
Start: 2022-02-28 | End: 2022-03-30 | Stop reason: SDUPTHER

## 2022-02-28 RX ORDER — DEXTROAMPHETAMINE SACCHARATE, AMPHETAMINE ASPARTATE MONOHYDRATE, DEXTROAMPHETAMINE SULFATE AND AMPHETAMINE SULFATE 5; 5; 5; 5 MG/1; MG/1; MG/1; MG/1
20 CAPSULE, EXTENDED RELEASE ORAL EVERY MORNING
Qty: 30 CAPSULE | Refills: 0 | Status: SHIPPED | OUTPATIENT
Start: 2022-02-28 | End: 2022-03-30 | Stop reason: SDUPTHER

## 2022-02-28 RX ORDER — FINASTERIDE 5 MG/1
5 TABLET, FILM COATED ORAL DAILY
Qty: 30 TABLET | Refills: 1 | Status: SHIPPED | OUTPATIENT
Start: 2022-02-28 | End: 2022-03-30 | Stop reason: SDUPTHER

## 2022-03-30 DIAGNOSIS — F90.2 ATTENTION DEFICIT HYPERACTIVITY DISORDER (ADHD), COMBINED TYPE: ICD-10-CM

## 2022-03-30 RX ORDER — FINASTERIDE 5 MG/1
5 TABLET, FILM COATED ORAL DAILY
Qty: 30 TABLET | Refills: 1 | Status: SHIPPED | OUTPATIENT
Start: 2022-03-30 | End: 2022-04-29 | Stop reason: SDUPTHER

## 2022-03-30 RX ORDER — DEXTROAMPHETAMINE SACCHARATE, AMPHETAMINE ASPARTATE MONOHYDRATE, DEXTROAMPHETAMINE SULFATE AND AMPHETAMINE SULFATE 5; 5; 5; 5 MG/1; MG/1; MG/1; MG/1
20 CAPSULE, EXTENDED RELEASE ORAL EVERY MORNING
Qty: 30 CAPSULE | Refills: 0 | Status: SHIPPED | OUTPATIENT
Start: 2022-03-30 | End: 2022-04-29 | Stop reason: SDUPTHER

## 2022-03-30 RX ORDER — DEXTROAMPHETAMINE SACCHARATE, AMPHETAMINE ASPARTATE, DEXTROAMPHETAMINE SULFATE AND AMPHETAMINE SULFATE 1.25; 1.25; 1.25; 1.25 MG/1; MG/1; MG/1; MG/1
5 TABLET ORAL DAILY
Qty: 30 TABLET | Refills: 0 | Status: SHIPPED | OUTPATIENT
Start: 2022-03-30 | End: 2022-04-29 | Stop reason: SDUPTHER

## 2022-04-29 ENCOUNTER — OFFICE VISIT (OUTPATIENT)
Dept: FAMILY MEDICINE CLINIC | Facility: CLINIC | Age: 47
End: 2022-04-29

## 2022-04-29 VITALS
TEMPERATURE: 97.6 F | HEIGHT: 69 IN | WEIGHT: 177 LBS | SYSTOLIC BLOOD PRESSURE: 128 MMHG | BODY MASS INDEX: 26.22 KG/M2 | DIASTOLIC BLOOD PRESSURE: 78 MMHG | OXYGEN SATURATION: 98 % | HEART RATE: 93 BPM

## 2022-04-29 DIAGNOSIS — N40.0 BENIGN PROSTATIC HYPERPLASIA WITHOUT LOWER URINARY TRACT SYMPTOMS: Primary | ICD-10-CM

## 2022-04-29 DIAGNOSIS — F90.2 ATTENTION DEFICIT HYPERACTIVITY DISORDER (ADHD), COMBINED TYPE: ICD-10-CM

## 2022-04-29 PROCEDURE — 99213 OFFICE O/P EST LOW 20 MIN: CPT | Performed by: NURSE PRACTITIONER

## 2022-04-29 RX ORDER — DEXTROAMPHETAMINE SACCHARATE, AMPHETAMINE ASPARTATE, DEXTROAMPHETAMINE SULFATE AND AMPHETAMINE SULFATE 1.25; 1.25; 1.25; 1.25 MG/1; MG/1; MG/1; MG/1
5 TABLET ORAL DAILY
Qty: 30 TABLET | Refills: 0 | Status: CANCELLED | OUTPATIENT
Start: 2022-04-29

## 2022-04-29 RX ORDER — DEXTROAMPHETAMINE SACCHARATE, AMPHETAMINE ASPARTATE MONOHYDRATE, DEXTROAMPHETAMINE SULFATE AND AMPHETAMINE SULFATE 5; 5; 5; 5 MG/1; MG/1; MG/1; MG/1
20 CAPSULE, EXTENDED RELEASE ORAL EVERY MORNING
Qty: 30 CAPSULE | Refills: 0 | Status: CANCELLED | OUTPATIENT
Start: 2022-04-29

## 2022-04-29 RX ORDER — FINASTERIDE 5 MG/1
5 TABLET, FILM COATED ORAL DAILY
Qty: 30 TABLET | Refills: 0 | Status: SHIPPED | OUTPATIENT
Start: 2022-04-29 | End: 2022-04-29 | Stop reason: SDUPTHER

## 2022-04-29 RX ORDER — FINASTERIDE 5 MG/1
5 TABLET, FILM COATED ORAL DAILY
Qty: 90 TABLET | Refills: 1 | Status: SHIPPED | OUTPATIENT
Start: 2022-04-29 | End: 2022-06-29 | Stop reason: SDUPTHER

## 2022-04-29 RX ORDER — DEXTROAMPHETAMINE SACCHARATE, AMPHETAMINE ASPARTATE, DEXTROAMPHETAMINE SULFATE AND AMPHETAMINE SULFATE 1.25; 1.25; 1.25; 1.25 MG/1; MG/1; MG/1; MG/1
5 TABLET ORAL DAILY
Qty: 30 TABLET | Refills: 0 | Status: SHIPPED | OUTPATIENT
Start: 2022-04-29 | End: 2022-05-31 | Stop reason: SDUPTHER

## 2022-04-29 RX ORDER — DEXTROAMPHETAMINE SACCHARATE, AMPHETAMINE ASPARTATE MONOHYDRATE, DEXTROAMPHETAMINE SULFATE AND AMPHETAMINE SULFATE 5; 5; 5; 5 MG/1; MG/1; MG/1; MG/1
20 CAPSULE, EXTENDED RELEASE ORAL EVERY MORNING
Qty: 30 CAPSULE | Refills: 0 | Status: SHIPPED | OUTPATIENT
Start: 2022-04-29 | End: 2022-05-31 | Stop reason: SDUPTHER

## 2022-05-31 DIAGNOSIS — F90.2 ATTENTION DEFICIT HYPERACTIVITY DISORDER (ADHD), COMBINED TYPE: ICD-10-CM

## 2022-05-31 RX ORDER — DEXTROAMPHETAMINE SACCHARATE, AMPHETAMINE ASPARTATE, DEXTROAMPHETAMINE SULFATE AND AMPHETAMINE SULFATE 1.25; 1.25; 1.25; 1.25 MG/1; MG/1; MG/1; MG/1
5 TABLET ORAL DAILY
Qty: 30 TABLET | Refills: 0 | Status: SHIPPED | OUTPATIENT
Start: 2022-05-31 | End: 2022-06-29 | Stop reason: SDUPTHER

## 2022-05-31 RX ORDER — DEXTROAMPHETAMINE SACCHARATE, AMPHETAMINE ASPARTATE MONOHYDRATE, DEXTROAMPHETAMINE SULFATE AND AMPHETAMINE SULFATE 5; 5; 5; 5 MG/1; MG/1; MG/1; MG/1
20 CAPSULE, EXTENDED RELEASE ORAL EVERY MORNING
Qty: 30 CAPSULE | Refills: 0 | Status: SHIPPED | OUTPATIENT
Start: 2022-05-31 | End: 2022-06-29 | Stop reason: SDUPTHER

## 2022-06-29 DIAGNOSIS — F90.2 ATTENTION DEFICIT HYPERACTIVITY DISORDER (ADHD), COMBINED TYPE: ICD-10-CM

## 2022-06-29 RX ORDER — DEXTROAMPHETAMINE SACCHARATE, AMPHETAMINE ASPARTATE MONOHYDRATE, DEXTROAMPHETAMINE SULFATE AND AMPHETAMINE SULFATE 5; 5; 5; 5 MG/1; MG/1; MG/1; MG/1
20 CAPSULE, EXTENDED RELEASE ORAL EVERY MORNING
Qty: 30 CAPSULE | Refills: 0 | Status: SHIPPED | OUTPATIENT
Start: 2022-06-29 | End: 2022-08-03 | Stop reason: SDUPTHER

## 2022-06-29 RX ORDER — FINASTERIDE 5 MG/1
5 TABLET, FILM COATED ORAL DAILY
Qty: 90 TABLET | Refills: 0 | Status: SHIPPED | OUTPATIENT
Start: 2022-06-29 | End: 2022-08-02 | Stop reason: SDUPTHER

## 2022-06-29 RX ORDER — DEXTROAMPHETAMINE SACCHARATE, AMPHETAMINE ASPARTATE, DEXTROAMPHETAMINE SULFATE AND AMPHETAMINE SULFATE 1.25; 1.25; 1.25; 1.25 MG/1; MG/1; MG/1; MG/1
5 TABLET ORAL DAILY
Qty: 30 TABLET | Refills: 0 | Status: SHIPPED | OUTPATIENT
Start: 2022-06-29 | End: 2022-08-03 | Stop reason: SDUPTHER

## 2022-08-02 DIAGNOSIS — F90.2 ATTENTION DEFICIT HYPERACTIVITY DISORDER (ADHD), COMBINED TYPE: ICD-10-CM

## 2022-08-02 RX ORDER — FINASTERIDE 5 MG/1
5 TABLET, FILM COATED ORAL DAILY
Qty: 90 TABLET | Refills: 0 | Status: SHIPPED | OUTPATIENT
Start: 2022-08-02 | End: 2022-10-17 | Stop reason: SDUPTHER

## 2022-08-02 RX ORDER — DEXTROAMPHETAMINE SACCHARATE, AMPHETAMINE ASPARTATE, DEXTROAMPHETAMINE SULFATE AND AMPHETAMINE SULFATE 1.25; 1.25; 1.25; 1.25 MG/1; MG/1; MG/1; MG/1
5 TABLET ORAL DAILY
Qty: 30 TABLET | Refills: 0 | OUTPATIENT
Start: 2022-08-02

## 2022-08-02 RX ORDER — DEXTROAMPHETAMINE SACCHARATE, AMPHETAMINE ASPARTATE MONOHYDRATE, DEXTROAMPHETAMINE SULFATE AND AMPHETAMINE SULFATE 5; 5; 5; 5 MG/1; MG/1; MG/1; MG/1
20 CAPSULE, EXTENDED RELEASE ORAL EVERY MORNING
Qty: 30 CAPSULE | Refills: 0 | OUTPATIENT
Start: 2022-08-02

## 2022-08-03 ENCOUNTER — OFFICE VISIT (OUTPATIENT)
Dept: FAMILY MEDICINE CLINIC | Facility: CLINIC | Age: 47
End: 2022-08-03

## 2022-08-03 VITALS
TEMPERATURE: 97.4 F | DIASTOLIC BLOOD PRESSURE: 66 MMHG | HEART RATE: 74 BPM | BODY MASS INDEX: 26.36 KG/M2 | SYSTOLIC BLOOD PRESSURE: 118 MMHG | OXYGEN SATURATION: 100 % | WEIGHT: 178 LBS | HEIGHT: 69 IN

## 2022-08-03 DIAGNOSIS — E61.1 LOW IRON: ICD-10-CM

## 2022-08-03 DIAGNOSIS — Z79.899 LONG TERM CURRENT USE OF THERAPEUTIC DRUG: ICD-10-CM

## 2022-08-03 DIAGNOSIS — F90.2 ATTENTION DEFICIT HYPERACTIVITY DISORDER (ADHD), COMBINED TYPE: Primary | ICD-10-CM

## 2022-08-03 DIAGNOSIS — Z00.00 GENERAL MEDICAL EXAM: ICD-10-CM

## 2022-08-03 DIAGNOSIS — F90.2 ATTENTION DEFICIT HYPERACTIVITY DISORDER (ADHD), COMBINED TYPE: ICD-10-CM

## 2022-08-03 PROCEDURE — 99213 OFFICE O/P EST LOW 20 MIN: CPT | Performed by: NURSE PRACTITIONER

## 2022-08-03 RX ORDER — DEXTROAMPHETAMINE SACCHARATE, AMPHETAMINE ASPARTATE, DEXTROAMPHETAMINE SULFATE AND AMPHETAMINE SULFATE 1.25; 1.25; 1.25; 1.25 MG/1; MG/1; MG/1; MG/1
5 TABLET ORAL DAILY
Qty: 30 TABLET | Refills: 0 | Status: SHIPPED | OUTPATIENT
Start: 2022-08-03 | End: 2022-09-16 | Stop reason: SDUPTHER

## 2022-08-03 RX ORDER — DEXTROAMPHETAMINE SACCHARATE, AMPHETAMINE ASPARTATE MONOHYDRATE, DEXTROAMPHETAMINE SULFATE AND AMPHETAMINE SULFATE 5; 5; 5; 5 MG/1; MG/1; MG/1; MG/1
20 CAPSULE, EXTENDED RELEASE ORAL EVERY MORNING
Qty: 30 CAPSULE | Refills: 0 | Status: SHIPPED | OUTPATIENT
Start: 2022-08-03 | End: 2022-09-16 | Stop reason: SDUPTHER

## 2022-08-04 NOTE — PROGRESS NOTES
Subjective   Howard Cotton is a 46 y.o. male who presents to the office for ADHD follow-up, he is requesting a refill of his Adderall.  Has been on this medication for several years and has always done well as it does keep him on track with his responsibilities at home and work.  Denies adverse side effects.  Is able to go to sleep at night without issue.  Will also need his Proscar that he takes for BPH.  Iron level was a little low, he tried supplements but they caused him to feel sick.  I'll recheck that level as well.    History of Present Illness     The following portions of the patient's history were reviewed and updated as appropriate: allergies, current medications, past family history, past medical history, past social history, past surgical history and problem list.    Review of Systems   Constitutional: Negative for chills, fatigue and fever.   HENT: Negative for congestion, sneezing, sore throat and trouble swallowing.    Eyes: Negative for visual disturbance.   Respiratory: Negative for cough, chest tightness, shortness of breath and wheezing.    Cardiovascular: Negative for chest pain, palpitations and leg swelling.   Gastrointestinal: Negative for abdominal pain, constipation, diarrhea, nausea and vomiting.   Genitourinary: Negative for dysuria, frequency and urgency.   Musculoskeletal: Negative for neck pain.   Skin: Negative for rash.   Neurological: Negative for dizziness, weakness and headaches.   Psychiatric/Behavioral: Positive for decreased concentration.        In the past two weeks the pt has not felt down, depressed, hopeless or lost interest in doing things   All other systems reviewed and are negative.      Objective   Physical Exam  Vitals and nursing note reviewed.   Constitutional:       General: He is not in acute distress.     Appearance: He is well-developed. He is not ill-appearing.   HENT:      Head: Normocephalic and atraumatic.      Right Ear: External ear normal.       Left Ear: External ear normal.      Nose: Nose normal.   Eyes:      General: No scleral icterus.        Right eye: No discharge.         Left eye: No discharge.      Conjunctiva/sclera: Conjunctivae normal.      Pupils: Pupils are equal, round, and reactive to light.   Neck:      Thyroid: No thyromegaly.   Cardiovascular:      Rate and Rhythm: Normal rate and regular rhythm.      Heart sounds: Normal heart sounds. No murmur heard.    No friction rub. No gallop.   Pulmonary:      Effort: Pulmonary effort is normal. No respiratory distress.      Breath sounds: Normal breath sounds. No wheezing or rales.   Abdominal:      General: Bowel sounds are normal. There is no distension.      Palpations: Abdomen is soft.      Tenderness: There is no abdominal tenderness. There is no guarding or rebound.   Musculoskeletal:         General: Normal range of motion.      Cervical back: Normal range of motion and neck supple.   Lymphadenopathy:      Cervical: No cervical adenopathy.   Skin:     General: Skin is warm and dry.      Capillary Refill: Capillary refill takes less than 2 seconds.      Findings: No rash.   Neurological:      General: No focal deficit present.      Mental Status: He is alert and oriented to person, place, and time.      GCS: GCS eye subscore is 4. GCS verbal subscore is 5. GCS motor subscore is 6.      Cranial Nerves: Cranial nerves are intact. No cranial nerve deficit.   Psychiatric:         Attention and Perception: Attention and perception normal.         Mood and Affect: Mood and affect normal.         Speech: Speech normal.         Behavior: Behavior normal. Behavior is cooperative.         Thought Content: Thought content normal. Thought content does not include homicidal or suicidal ideation. Thought content does not include homicidal or suicidal plan.         Cognition and Memory: Cognition and memory normal.         Judgment: Judgment normal.      Comments: Dressed appropriately for weather and  situation, makes eye contact and engages in conversation         Assessment & Plan   Diagnoses and all orders for this visit:    1. Attention deficit hyperactivity disorder (ADHD), combined type (Primary)    2. Long term current use of therapeutic drug  -     Urine Drug Screen - Urine, Clean Catch; Future    3. General medical exam  -     CBC & Differential; Future  -     Comprehensive Metabolic Panel; Future  -     T4, Free; Future  -     TSH; Future  -     Urinalysis With Culture If Indicated -; Future  -     Iron; Future  -     Lipid Panel; Future    4. Low iron  -     Iron; Future    Encouraged to continue with medications as ordered  Is aware of fasting labs needed.       Patient understands the risks associated with this controlled medication, including tolerance and addiction.  Patient also agrees to only obtain this medication from me, and not from a another provider, unless that provider is covering for me in my absence.  Patient also agrees to be compliant in dosing, and not self adjust the dose of medication.  A signed controlled substance agreement is on file, and the patient has received a controlled substance education sheet at this a previous visit.  The patient has also signed a consent for treatment with a controlled substance as per [facility] policy. KEITH was obtained, reviewed # [id number].          This document has been electronically signed by CEFERINO Loo on August 5, 2022 07:45 CDT

## 2022-09-16 DIAGNOSIS — F90.2 ATTENTION DEFICIT HYPERACTIVITY DISORDER (ADHD), COMBINED TYPE: ICD-10-CM

## 2022-09-19 RX ORDER — DEXTROAMPHETAMINE SACCHARATE, AMPHETAMINE ASPARTATE, DEXTROAMPHETAMINE SULFATE AND AMPHETAMINE SULFATE 1.25; 1.25; 1.25; 1.25 MG/1; MG/1; MG/1; MG/1
5 TABLET ORAL DAILY
Qty: 30 TABLET | Refills: 0 | Status: SHIPPED | OUTPATIENT
Start: 2022-09-19 | End: 2022-10-17 | Stop reason: SDUPTHER

## 2022-09-19 RX ORDER — DEXTROAMPHETAMINE SACCHARATE, AMPHETAMINE ASPARTATE MONOHYDRATE, DEXTROAMPHETAMINE SULFATE AND AMPHETAMINE SULFATE 5; 5; 5; 5 MG/1; MG/1; MG/1; MG/1
20 CAPSULE, EXTENDED RELEASE ORAL EVERY MORNING
Qty: 30 CAPSULE | Refills: 0 | Status: SHIPPED | OUTPATIENT
Start: 2022-09-19 | End: 2022-10-17 | Stop reason: SDUPTHER

## 2022-10-05 RX ORDER — SEMAGLUTIDE 0.25 MG/.5ML
0.25 INJECTION, SOLUTION SUBCUTANEOUS WEEKLY
Qty: 2 ML | Refills: 1 | Status: SHIPPED | OUTPATIENT
Start: 2022-10-05

## 2022-10-10 ENCOUNTER — TELEPHONE (OUTPATIENT)
Dept: FAMILY MEDICINE CLINIC | Facility: CLINIC | Age: 47
End: 2022-10-10

## 2022-10-10 NOTE — TELEPHONE ENCOUNTER
Contacted the patient to remind him of the fasting lab orders that are in place. No answer, left voice message regarding this per verbal release form. Advised him to contact the office with any concerns. Lab orders will be extended.

## 2022-10-17 DIAGNOSIS — F90.2 ATTENTION DEFICIT HYPERACTIVITY DISORDER (ADHD), COMBINED TYPE: ICD-10-CM

## 2022-10-17 RX ORDER — FINASTERIDE 5 MG/1
5 TABLET, FILM COATED ORAL DAILY
Qty: 90 TABLET | Refills: 0 | Status: SHIPPED | OUTPATIENT
Start: 2022-10-17 | End: 2022-12-07 | Stop reason: SDUPTHER

## 2022-10-17 RX ORDER — ACYCLOVIR 800 MG/1
800 TABLET ORAL 4 TIMES DAILY PRN
Qty: 56 TABLET | Refills: 1 | Status: SHIPPED | OUTPATIENT
Start: 2022-10-17

## 2022-10-17 RX ORDER — DEXTROAMPHETAMINE SACCHARATE, AMPHETAMINE ASPARTATE MONOHYDRATE, DEXTROAMPHETAMINE SULFATE AND AMPHETAMINE SULFATE 5; 5; 5; 5 MG/1; MG/1; MG/1; MG/1
20 CAPSULE, EXTENDED RELEASE ORAL EVERY MORNING
Qty: 30 CAPSULE | Refills: 0 | Status: SHIPPED | OUTPATIENT
Start: 2022-10-17 | End: 2022-12-07 | Stop reason: SDUPTHER

## 2022-10-17 RX ORDER — DEXTROAMPHETAMINE SACCHARATE, AMPHETAMINE ASPARTATE, DEXTROAMPHETAMINE SULFATE AND AMPHETAMINE SULFATE 1.25; 1.25; 1.25; 1.25 MG/1; MG/1; MG/1; MG/1
5 TABLET ORAL DAILY
Qty: 30 TABLET | Refills: 0 | Status: SHIPPED | OUTPATIENT
Start: 2022-10-17 | End: 2022-12-07 | Stop reason: SDUPTHER

## 2022-12-07 ENCOUNTER — OFFICE VISIT (OUTPATIENT)
Dept: FAMILY MEDICINE CLINIC | Facility: CLINIC | Age: 47
End: 2022-12-07

## 2022-12-07 VITALS
BODY MASS INDEX: 27.11 KG/M2 | DIASTOLIC BLOOD PRESSURE: 80 MMHG | HEART RATE: 99 BPM | HEIGHT: 69 IN | SYSTOLIC BLOOD PRESSURE: 132 MMHG | OXYGEN SATURATION: 98 % | WEIGHT: 183 LBS | TEMPERATURE: 99.1 F

## 2022-12-07 DIAGNOSIS — N40.0 BENIGN PROSTATIC HYPERPLASIA WITHOUT LOWER URINARY TRACT SYMPTOMS: ICD-10-CM

## 2022-12-07 DIAGNOSIS — F90.2 ATTENTION DEFICIT HYPERACTIVITY DISORDER (ADHD), COMBINED TYPE: Primary | ICD-10-CM

## 2022-12-07 DIAGNOSIS — J30.9 ALLERGIC RHINITIS, UNSPECIFIED SEASONALITY, UNSPECIFIED TRIGGER: ICD-10-CM

## 2022-12-07 DIAGNOSIS — F90.2 ATTENTION DEFICIT HYPERACTIVITY DISORDER (ADHD), COMBINED TYPE: ICD-10-CM

## 2022-12-07 PROCEDURE — 96372 THER/PROPH/DIAG INJ SC/IM: CPT | Performed by: NURSE PRACTITIONER

## 2022-12-07 PROCEDURE — 99214 OFFICE O/P EST MOD 30 MIN: CPT | Performed by: NURSE PRACTITIONER

## 2022-12-07 RX ORDER — TRIAMCINOLONE ACETONIDE 40 MG/ML
40 INJECTION, SUSPENSION INTRA-ARTICULAR; INTRAMUSCULAR ONCE
Status: COMPLETED | OUTPATIENT
Start: 2022-12-07 | End: 2022-12-07

## 2022-12-07 RX ORDER — FINASTERIDE 5 MG/1
5 TABLET, FILM COATED ORAL DAILY
Qty: 90 TABLET | Refills: 0 | Status: SHIPPED | OUTPATIENT
Start: 2022-12-07 | End: 2022-12-07 | Stop reason: SDUPTHER

## 2022-12-07 RX ORDER — FINASTERIDE 5 MG/1
5 TABLET, FILM COATED ORAL DAILY
Qty: 90 TABLET | Refills: 1 | Status: SHIPPED | OUTPATIENT
Start: 2022-12-07 | End: 2023-03-29 | Stop reason: SDUPTHER

## 2022-12-07 RX ORDER — DEXTROAMPHETAMINE SACCHARATE, AMPHETAMINE ASPARTATE MONOHYDRATE, DEXTROAMPHETAMINE SULFATE AND AMPHETAMINE SULFATE 5; 5; 5; 5 MG/1; MG/1; MG/1; MG/1
20 CAPSULE, EXTENDED RELEASE ORAL EVERY MORNING
Qty: 30 CAPSULE | Refills: 0 | Status: SHIPPED | OUTPATIENT
Start: 2022-12-07 | End: 2023-03-30 | Stop reason: RX

## 2022-12-07 RX ORDER — LORATADINE 10 MG/1
10 TABLET ORAL DAILY
Qty: 30 TABLET | Refills: 5 | Status: SHIPPED | OUTPATIENT
Start: 2022-12-07

## 2022-12-07 RX ORDER — DEXTROAMPHETAMINE SACCHARATE, AMPHETAMINE ASPARTATE, DEXTROAMPHETAMINE SULFATE AND AMPHETAMINE SULFATE 1.25; 1.25; 1.25; 1.25 MG/1; MG/1; MG/1; MG/1
5 TABLET ORAL DAILY
Qty: 30 TABLET | Refills: 0 | Status: SHIPPED | OUTPATIENT
Start: 2022-12-07 | End: 2023-03-30 | Stop reason: RX

## 2022-12-07 RX ADMIN — TRIAMCINOLONE ACETONIDE 40 MG: 40 INJECTION, SUSPENSION INTRA-ARTICULAR; INTRAMUSCULAR at 14:41

## 2022-12-07 NOTE — PROGRESS NOTES
Subjective   Howard Cotton is a 47 y.o. male who presents to the office for attention-deficit follow-up.  Continues to do well on his Adderall.  Has no complaints or issues with that.  He is, however, dealing with nasal congestion and runny nose since Sunday evening.    History of Present Illness     The following portions of the patient's history were reviewed and updated as appropriate: allergies, current medications, past family history, past medical history, past social history, past surgical history and problem list.    Review of Systems   Constitutional: Negative for chills, fatigue and fever.   HENT: Positive for congestion and rhinorrhea. Negative for sneezing, sore throat and trouble swallowing.    Eyes: Negative for visual disturbance.   Respiratory: Negative for cough, chest tightness, shortness of breath and wheezing.    Cardiovascular: Negative for chest pain, palpitations and leg swelling.   Gastrointestinal: Negative for abdominal pain, constipation, diarrhea, nausea and vomiting.   Genitourinary: Negative for dysuria, frequency and urgency.   Musculoskeletal: Negative for neck pain.   Skin: Negative for rash.   Neurological: Negative for dizziness, weakness and headaches.   Psychiatric/Behavioral: Positive for decreased concentration.        In the past two weeks the pt has not felt down, depressed, hopeless or lost interest in doing things   All other systems reviewed and are negative.      Objective   Physical Exam  Vitals and nursing note reviewed.   Constitutional:       General: He is not in acute distress.     Appearance: He is well-developed. He is not ill-appearing.   HENT:      Head: Normocephalic and atraumatic.      Right Ear: External ear normal.      Left Ear: External ear normal.      Nose: Congestion and rhinorrhea present.   Eyes:      General: No scleral icterus.        Right eye: No discharge.         Left eye: No discharge.      Conjunctiva/sclera: Conjunctivae normal.       Pupils: Pupils are equal, round, and reactive to light.   Neck:      Thyroid: No thyromegaly.   Cardiovascular:      Rate and Rhythm: Normal rate and regular rhythm.      Heart sounds: Normal heart sounds. No murmur heard.    No friction rub. No gallop.   Pulmonary:      Effort: Pulmonary effort is normal. No respiratory distress.      Breath sounds: Normal breath sounds. No wheezing or rales.   Abdominal:      General: Bowel sounds are normal. There is no distension.      Palpations: Abdomen is soft.      Tenderness: There is no abdominal tenderness. There is no guarding or rebound.   Musculoskeletal:         General: Normal range of motion.      Cervical back: Normal range of motion and neck supple.   Lymphadenopathy:      Cervical: No cervical adenopathy.   Skin:     General: Skin is warm and dry.      Capillary Refill: Capillary refill takes less than 2 seconds.      Findings: No rash.   Neurological:      General: No focal deficit present.      Mental Status: He is alert and oriented to person, place, and time.      GCS: GCS eye subscore is 4. GCS verbal subscore is 5. GCS motor subscore is 6.      Cranial Nerves: Cranial nerves 2-12 are intact. No cranial nerve deficit.   Psychiatric:         Behavior: Behavior normal. Behavior is cooperative.         Thought Content: Thought content normal.         Judgment: Judgment normal.         Assessment & Plan   Diagnoses and all orders for this visit:    1. Attention deficit hyperactivity disorder (ADHD), combined type (Primary)    2. Allergic rhinitis, unspecified seasonality, unspecified trigger  -     triamcinolone acetonide (KENALOG-40) injection 40 mg    3. Benign prostatic hyperplasia without lower urinary tract symptoms    Other orders  -     finasteride (PROSCAR) 5 MG tablet; Take 1 tablet by mouth Daily.  Dispense: 90 tablet; Refill: 1  -     loratadine (CLARITIN) 10 MG tablet; Take 1 tablet by mouth Daily.  Dispense: 30 tablet; Refill: 5    Encouraged to  continue with meds as ordered.  No smoke exposure.  Good handwashing.     Patient understands the risks associated with this controlled medication, including tolerance and addiction.  Patient also agrees to only obtain this medication from me, and not from a another provider, unless that provider is covering for me in my absence.  Patient also agrees to be compliant in dosing, and not self adjust the dose of medication.  A signed controlled substance agreement is on file, and the patient has received a controlled substance education sheet at this a previous visit.  The patient has also signed a consent for treatment with a controlled substance as per [facility] policy. KEITH was obtained, reviewed # [id number].          This document has been electronically signed by CEFERINO Loo on December 10, 2022 05:01 CST

## 2023-03-13 ENCOUNTER — TELEPHONE (OUTPATIENT)
Dept: FAMILY MEDICINE CLINIC | Facility: CLINIC | Age: 48
End: 2023-03-13
Payer: COMMERCIAL

## 2023-03-13 NOTE — TELEPHONE ENCOUNTER
Working the overdue results, patient is due for fasting labs 1 week prior to an appt w/Magdiel. Labs have been ordered. Please call patient to schedule appt with fasting labs 1 week prior to appt.

## 2023-03-29 ENCOUNTER — OFFICE VISIT (OUTPATIENT)
Dept: FAMILY MEDICINE CLINIC | Facility: CLINIC | Age: 48
End: 2023-03-29
Payer: COMMERCIAL

## 2023-03-29 VITALS
BODY MASS INDEX: 26.96 KG/M2 | SYSTOLIC BLOOD PRESSURE: 124 MMHG | OXYGEN SATURATION: 99 % | HEIGHT: 69 IN | TEMPERATURE: 97.2 F | HEART RATE: 71 BPM | WEIGHT: 182 LBS | DIASTOLIC BLOOD PRESSURE: 76 MMHG

## 2023-03-29 DIAGNOSIS — F90.2 ATTENTION DEFICIT HYPERACTIVITY DISORDER (ADHD), COMBINED TYPE: Primary | ICD-10-CM

## 2023-03-29 DIAGNOSIS — E66.3 OVERWEIGHT (BMI 25.0-29.9): ICD-10-CM

## 2023-03-29 RX ORDER — FINASTERIDE 5 MG/1
5 TABLET, FILM COATED ORAL DAILY
Qty: 90 TABLET | Refills: 1 | Status: SHIPPED | OUTPATIENT
Start: 2023-03-29

## 2023-03-29 NOTE — PROGRESS NOTES
Subjective   Howard Cotton is a 47 y.o. male who presents to the office for attention-deficit follow-up.  Our area, unfortunately, is experiencing a shortage of Adderall.  He has not had the medication since December, wants to discuss other options.  Continues to be interested in weight loss injections, has a script for the Wegovy from last visit and has a prescription coupon as well.  The total he will have to pay for a month's worth of Wegovy will be $1000.  BMI today is 26.88.  Wants to get his weight down to 165 pounds so he can play golf with his son and walk the course without shortness of breath.    History of Present Illness     The following portions of the patient's history were reviewed and updated as appropriate: allergies, current medications, past family history, past medical history, past social history, past surgical history and problem list.    Review of Systems   Constitutional: Negative for chills, fatigue and fever.   HENT: Negative for congestion, sneezing, sore throat and trouble swallowing.    Eyes: Negative for visual disturbance.   Respiratory: Negative for cough, chest tightness, shortness of breath and wheezing.    Cardiovascular: Negative for chest pain, palpitations and leg swelling.   Gastrointestinal: Negative for abdominal pain, constipation, diarrhea, nausea and vomiting.   Genitourinary: Negative for dysuria, frequency and urgency.   Musculoskeletal: Negative for neck pain.   Skin: Negative for rash.   Neurological: Negative for dizziness, weakness and headaches.   Psychiatric/Behavioral: Positive for decreased concentration.        In the past two weeks the pt has not felt down, depressed, hopeless or lost interest in doing things   All other systems reviewed and are negative.      Objective   Physical Exam  Vitals and nursing note reviewed.   Constitutional:       General: He is not in acute distress.     Appearance: He is well-developed. He is not ill-appearing.   HENT:       Head: Normocephalic and atraumatic.      Right Ear: External ear normal.      Left Ear: External ear normal.      Nose: Nose normal.   Eyes:      General: No scleral icterus.        Right eye: No discharge.         Left eye: No discharge.      Conjunctiva/sclera: Conjunctivae normal.      Pupils: Pupils are equal, round, and reactive to light.   Neck:      Thyroid: No thyromegaly.   Cardiovascular:      Rate and Rhythm: Normal rate and regular rhythm.      Heart sounds: Normal heart sounds. No murmur heard.    No friction rub. No gallop.   Pulmonary:      Effort: Pulmonary effort is normal. No respiratory distress.      Breath sounds: Normal breath sounds. No wheezing or rales.   Abdominal:      General: Bowel sounds are normal. There is no distension.      Palpations: Abdomen is soft.      Tenderness: There is no abdominal tenderness. There is no guarding or rebound.   Musculoskeletal:         General: Normal range of motion.      Cervical back: Normal range of motion and neck supple.   Lymphadenopathy:      Cervical: No cervical adenopathy.   Skin:     General: Skin is warm and dry.      Capillary Refill: Capillary refill takes less than 2 seconds.      Findings: No rash.   Neurological:      General: No focal deficit present.      Mental Status: He is alert and oriented to person, place, and time.      GCS: GCS eye subscore is 4. GCS verbal subscore is 5. GCS motor subscore is 6.      Cranial Nerves: Cranial nerves 2-12 are intact. No cranial nerve deficit.      Sensory: Sensation is intact.      Motor: Motor function is intact.      Coordination: Coordination is intact.      Gait: Gait is intact.   Psychiatric:         Attention and Perception: Attention and perception normal.         Mood and Affect: Mood and affect normal.         Speech: Speech normal.         Behavior: Behavior normal. Behavior is cooperative.         Thought Content: Thought content normal. Thought content does not include homicidal or  suicidal ideation. Thought content does not include homicidal or suicidal plan.         Cognition and Memory: Cognition and memory normal.         Judgment: Judgment normal.      Comments: Dressed appropriately for weather and situation, makes eye contact and engages in conversation         Assessment & Plan   Diagnoses and all orders for this visit:    1. Attention deficit hyperactivity disorder (ADHD), combined type (Primary)    2. Overweight (BMI 25.0-29.9)    Other orders  -     finasteride (PROSCAR) 5 MG tablet; Take 1 tablet by mouth Daily.  Dispense: 90 tablet; Refill: 1    Will consult with Dr. Carlton about starting patient on Vyvanse.  Adderall shortage does not look resolvable in the foreseeable future.  He will let my office know of any adverse side effects.  Educated patient that the weight loss injections, in conjunction with wise dietary choices and consistent cardio exercise do help to lose weight.  The goal is to take the medication for the least amount of time all the while continuing with exercise and wise diet choices.  If he does choose to start the injections he probably will not need to be on it for very long.     Patient understands the risks associated with this controlled medication, including tolerance and addiction.  Patient also agrees to only obtain this medication from me, and not from a another provider, unless that provider is covering for me in my absence.  Patient also agrees to be compliant in dosing, and not self adjust the dose of medication.  A signed controlled substance agreement is on file, and the patient has received a controlled substance education sheet at this a previous visit.  The patient has also signed a consent for treatment with a controlled substance as per [facility] policy. KEITH was obtained, reviewed and handed off to Dr. Carlton.          This document has been electronically signed by CEFERINO Loo on March 29, 2023 13:49 CDT    Answers for HPI/ROS  submitted by the patient on 3/27/2023  What is the primary reason for your visit?: Other  Please describe your symptoms.: I need to get a replacement for Adderall  Have you had these symptoms before?: No  How long have you been having these symptoms?: 1-4 days  Please list any medications you are currently taking for this condition.: none  Please describe any probable cause for these symptoms. : none

## 2023-03-30 DIAGNOSIS — F90.2 ATTENTION DEFICIT HYPERACTIVITY DISORDER (ADHD), COMBINED TYPE: Primary | ICD-10-CM

## 2023-04-17 ENCOUNTER — DOCUMENTATION (OUTPATIENT)
Dept: FAMILY MEDICINE CLINIC | Facility: CLINIC | Age: 48
End: 2023-04-17
Payer: COMMERCIAL

## 2023-04-17 NOTE — PROGRESS NOTES
Multiple attempts have been made to bring the patient in clinic to complete the fasting labs in place. Labs have yet to be completed. Orders will be removed from overdue basket.

## 2023-05-03 DIAGNOSIS — F90.2 ATTENTION DEFICIT HYPERACTIVITY DISORDER (ADHD), COMBINED TYPE: ICD-10-CM

## 2023-06-05 DIAGNOSIS — F90.2 ATTENTION DEFICIT HYPERACTIVITY DISORDER (ADHD), COMBINED TYPE: ICD-10-CM

## 2023-08-14 DIAGNOSIS — F90.2 ATTENTION DEFICIT HYPERACTIVITY DISORDER (ADHD), COMBINED TYPE: ICD-10-CM

## 2023-09-11 DIAGNOSIS — F90.2 ATTENTION DEFICIT HYPERACTIVITY DISORDER (ADHD), COMBINED TYPE: ICD-10-CM

## 2023-09-11 DIAGNOSIS — N40.0 BENIGN PROSTATIC HYPERPLASIA WITHOUT LOWER URINARY TRACT SYMPTOMS: Primary | ICD-10-CM

## 2023-09-11 RX ORDER — FINASTERIDE 5 MG/1
5 TABLET, FILM COATED ORAL DAILY
Qty: 90 TABLET | Refills: 1 | Status: SHIPPED | OUTPATIENT
Start: 2023-09-11